# Patient Record
Sex: MALE | Race: WHITE | NOT HISPANIC OR LATINO | Employment: FULL TIME | URBAN - METROPOLITAN AREA
[De-identification: names, ages, dates, MRNs, and addresses within clinical notes are randomized per-mention and may not be internally consistent; named-entity substitution may affect disease eponyms.]

---

## 2017-04-07 ENCOUNTER — TRANSCRIBE ORDERS (OUTPATIENT)
Dept: ADMINISTRATIVE | Facility: HOSPITAL | Age: 57
End: 2017-04-07

## 2017-04-07 DIAGNOSIS — K21.00 GERD WITH ESOPHAGITIS: ICD-10-CM

## 2017-04-07 DIAGNOSIS — K45.8 RECURRENT ABDOMINAL HERNIA WITHOUT OBSTRUCTION OR GANGRENE, UNSPECIFIED HERNIA TYPE: Primary | ICD-10-CM

## 2017-04-18 ENCOUNTER — HOSPITAL ENCOUNTER (OUTPATIENT)
Facility: HOSPITAL | Age: 57
Setting detail: HOSPITAL OUTPATIENT SURGERY
Discharge: HOME | End: 2017-04-18
Attending: SURGERY | Admitting: SURGERY

## 2017-04-18 ENCOUNTER — HOSPITAL ENCOUNTER (OUTPATIENT)
Dept: CT IMAGING | Facility: HOSPITAL | Age: 57
Discharge: HOME | End: 2017-04-18
Attending: SURGERY

## 2017-04-18 DIAGNOSIS — K45.8 RECURRENT ABDOMINAL HERNIA WITHOUT OBSTRUCTION OR GANGRENE, UNSPECIFIED HERNIA TYPE: ICD-10-CM

## 2017-04-18 PROCEDURE — 74176 CT ABD & PELVIS W/O CONTRAST: CPT

## 2017-04-18 PROCEDURE — 91010 ESOPHAGUS MOTILITY STUDY: CPT | Performed by: SURGERY

## 2017-04-18 NOTE — PERIOPERATIVE NURSING NOTE
0955  Attempted to pass manometry catheter into stomach numerous times.  Passed easily through nose but repeatedly curled in throat and mouth causing patient to wretch and gag tube back up into mouth. Patient repeatedly helped us try to swallow tube into stomach without success. Passed easily to 32 cm and unable to pass more than this.

## 2017-04-28 ENCOUNTER — LAB REQUISITION (OUTPATIENT)
Dept: LAB | Facility: HOSPITAL | Age: 57
End: 2017-04-28

## 2017-04-28 DIAGNOSIS — K29.70 GASTRITIS WITHOUT BLEEDING: ICD-10-CM

## 2017-04-28 PROCEDURE — 88305 TISSUE EXAM BY PATHOLOGIST: CPT | Performed by: SURGERY

## 2017-05-01 ENCOUNTER — HOSPITAL ENCOUNTER (OUTPATIENT)
Dept: GENERAL RADIOLOGY | Facility: HOSPITAL | Age: 57
Discharge: HOME | End: 2017-05-01
Attending: SURGERY | Admitting: SURGERY

## 2017-05-01 ENCOUNTER — HOSPITAL ENCOUNTER (OUTPATIENT)
Dept: NUCLEAR MEDICINE | Facility: HOSPITAL | Age: 57
Discharge: HOME | End: 2017-05-01
Attending: SURGERY

## 2017-05-01 DIAGNOSIS — K21.00 GERD WITH ESOPHAGITIS: ICD-10-CM

## 2017-05-01 LAB
CYTO UR: NORMAL
LAB AP CASE REPORT: NORMAL
LAB AP CLINICAL INFORMATION: NORMAL
Lab: NORMAL
PATH REPORT.FINAL DX SPEC: NORMAL
PATH REPORT.GROSS SPEC: NORMAL

## 2017-05-01 PROCEDURE — 74220 X-RAY XM ESOPHAGUS 1CNTRST: CPT

## 2017-05-12 ENCOUNTER — HOSPITAL ENCOUNTER (OUTPATIENT)
Dept: NUCLEAR MEDICINE | Facility: HOSPITAL | Age: 57
Discharge: HOME | End: 2017-05-12
Attending: SURGERY

## 2017-05-12 PROCEDURE — 78264 GASTRIC EMPTYING IMG STUDY: CPT

## 2017-05-12 PROCEDURE — 0 TECHNETIUM SULFUR COLLOID: Performed by: SURGERY

## 2017-05-12 PROCEDURE — A9541 TC99M SULFUR COLLOID: HCPCS | Performed by: SURGERY

## 2017-05-12 RX ADMIN — Medication 1 DOSE: at 09:23

## 2017-06-05 ENCOUNTER — HOSPITAL ENCOUNTER (OUTPATIENT)
Dept: GENERAL RADIOLOGY | Facility: HOSPITAL | Age: 57
Discharge: HOME | End: 2017-06-05
Admitting: SURGERY

## 2017-06-05 ENCOUNTER — APPOINTMENT (OUTPATIENT)
Dept: PREADMISSION TESTING | Facility: HOSPITAL | Age: 57
End: 2017-06-05

## 2017-06-05 VITALS — BODY MASS INDEX: 32.58 KG/M2 | HEIGHT: 69 IN | WEIGHT: 220 LBS

## 2017-06-05 LAB
ALBUMIN SERPL-MCNC: 4.3 G/DL (ref 3.2–4.8)
ALBUMIN/GLOB SERPL: 1.4 G/DL (ref 1.5–2.5)
ALP SERPL-CCNC: 122 U/L (ref 25–100)
ALT SERPL W P-5'-P-CCNC: 30 U/L (ref 7–40)
ANION GAP SERPL CALCULATED.3IONS-SCNC: 5 MMOL/L (ref 3–11)
APTT PPP: <24 SECONDS (ref 24–31)
AST SERPL-CCNC: 28 U/L (ref 0–33)
BILIRUB SERPL-MCNC: 0.3 MG/DL (ref 0.3–1.2)
BUN BLD-MCNC: 15 MG/DL (ref 9–23)
BUN/CREAT SERPL: 16.7 (ref 7–25)
CALCIUM SPEC-SCNC: 9.3 MG/DL (ref 8.7–10.4)
CHLORIDE SERPL-SCNC: 109 MMOL/L (ref 99–109)
CO2 SERPL-SCNC: 25 MMOL/L (ref 20–31)
CREAT BLD-MCNC: 0.9 MG/DL (ref 0.6–1.3)
DEPRECATED RDW RBC AUTO: 47.7 FL (ref 37–54)
ERYTHROCYTE [DISTWIDTH] IN BLOOD BY AUTOMATED COUNT: 18.9 % (ref 11.3–14.5)
GFR SERPL CREATININE-BSD FRML MDRD: 88 ML/MIN/1.73
GLOBULIN UR ELPH-MCNC: 3 GM/DL
GLUCOSE BLD-MCNC: 91 MG/DL (ref 70–100)
HCT VFR BLD AUTO: 32.5 % (ref 38.9–50.9)
HGB BLD-MCNC: 8.9 G/DL (ref 13.1–17.5)
INR PPP: 0.96
MCH RBC QN AUTO: 19.1 PG (ref 27–31)
MCHC RBC AUTO-ENTMCNC: 27.4 G/DL (ref 32–36)
MCV RBC AUTO: 69.6 FL (ref 80–99)
PLATELET # BLD AUTO: 269 10*3/MM3 (ref 150–450)
PMV BLD AUTO: 8.5 FL (ref 6–12)
POTASSIUM BLD-SCNC: 4.4 MMOL/L (ref 3.5–5.5)
PROT SERPL-MCNC: 7.3 G/DL (ref 5.7–8.2)
PROTHROMBIN TIME: 10.5 SECONDS (ref 9.6–11.5)
RBC # BLD AUTO: 4.67 10*6/MM3 (ref 4.2–5.76)
SODIUM BLD-SCNC: 139 MMOL/L (ref 132–146)
WBC NRBC COR # BLD: 6.12 10*3/MM3 (ref 3.5–10.8)

## 2017-06-05 PROCEDURE — 93005 ELECTROCARDIOGRAM TRACING: CPT

## 2017-06-05 PROCEDURE — 80053 COMPREHEN METABOLIC PANEL: CPT | Performed by: SURGERY

## 2017-06-05 PROCEDURE — 85730 THROMBOPLASTIN TIME PARTIAL: CPT | Performed by: SURGERY

## 2017-06-05 PROCEDURE — 85027 COMPLETE CBC AUTOMATED: CPT | Performed by: SURGERY

## 2017-06-05 PROCEDURE — 93010 ELECTROCARDIOGRAM REPORT: CPT | Performed by: INTERNAL MEDICINE

## 2017-06-05 PROCEDURE — 36415 COLL VENOUS BLD VENIPUNCTURE: CPT

## 2017-06-05 PROCEDURE — 85610 PROTHROMBIN TIME: CPT | Performed by: SURGERY

## 2017-06-05 PROCEDURE — 71020 HC CHEST PA AND LATERAL: CPT

## 2017-06-05 RX ORDER — CEFAZOLIN SODIUM 2 G/100ML
2 INJECTION, SOLUTION INTRAVENOUS ONCE
Status: DISCONTINUED | OUTPATIENT
Start: 2017-06-08 | End: 2017-06-05

## 2017-06-05 RX ORDER — ASPIRIN 81 MG/1
81 TABLET, CHEWABLE ORAL DAILY
COMMUNITY

## 2017-06-05 NOTE — PAT
PT GIVEN PEG EDUCATIONAL FOLDER WITH INSTRUCTIONS RE: HOW TO CARE FOR IT, WHO TO CALL FOR QUESTIONS, MAINTENANCE, ETC.

## 2017-06-05 NOTE — PAT
Abnormal EKG called to Dr. Kapadia who came to [facility] and saw patient. OK to proceed with surgery, no further orders.

## 2017-06-08 ENCOUNTER — ANESTHESIA (OUTPATIENT)
Dept: PERIOP | Facility: HOSPITAL | Age: 57
End: 2017-06-08

## 2017-06-08 ENCOUNTER — HOSPITAL ENCOUNTER (OUTPATIENT)
Facility: HOSPITAL | Age: 57
Setting detail: OBSERVATION
Discharge: HOME | End: 2017-06-09
Attending: SURGERY | Admitting: SURGERY

## 2017-06-08 ENCOUNTER — ANESTHESIA EVENT (OUTPATIENT)
Dept: PERIOP | Facility: HOSPITAL | Age: 57
End: 2017-06-08

## 2017-06-08 DIAGNOSIS — K44.9 PARAESOPHAGEAL HERNIA: ICD-10-CM

## 2017-06-08 PROCEDURE — 25010000003 CEFAZOLIN IN DEXTROSE 2-4 GM/100ML-% SOLUTION: Performed by: SURGERY

## 2017-06-08 PROCEDURE — C1781 MESH (IMPLANTABLE): HCPCS | Performed by: SURGERY

## 2017-06-08 PROCEDURE — 25010000002 KETOROLAC TROMETHAMINE PER 15 MG: Performed by: NURSE ANESTHETIST, CERTIFIED REGISTERED

## 2017-06-08 PROCEDURE — G0378 HOSPITAL OBSERVATION PER HR: HCPCS

## 2017-06-08 PROCEDURE — 25010000002 DEXAMETHASONE PER 1 MG: Performed by: NURSE ANESTHETIST, CERTIFIED REGISTERED

## 2017-06-08 PROCEDURE — 25010000002 NEOSTIGMINE 10 MG/10ML SOLUTION: Performed by: NURSE ANESTHETIST, CERTIFIED REGISTERED

## 2017-06-08 PROCEDURE — 88302 TISSUE EXAM BY PATHOLOGIST: CPT | Performed by: SURGERY

## 2017-06-08 PROCEDURE — 25010000002 HYDROMORPHONE PER 4 MG: Performed by: NURSE ANESTHETIST, CERTIFIED REGISTERED

## 2017-06-08 PROCEDURE — 25010000002 ONDANSETRON PER 1 MG: Performed by: NURSE ANESTHETIST, CERTIFIED REGISTERED

## 2017-06-08 PROCEDURE — 25010000002 PROPOFOL 10 MG/ML EMULSION: Performed by: NURSE ANESTHETIST, CERTIFIED REGISTERED

## 2017-06-08 PROCEDURE — 25010000002 FENTANYL CITRATE (PF) 100 MCG/2ML SOLUTION: Performed by: NURSE ANESTHETIST, CERTIFIED REGISTERED

## 2017-06-08 PROCEDURE — 25010000002 HYDROMORPHONE HCL-NACL 50-0.9 MG/50ML-% SOLUTION PREFILLED SYRINGE: Performed by: SURGERY

## 2017-06-08 DEVICE — PERCUTANEOUS ENDOSCOPIC GASTROSTOMY KIT
Type: IMPLANTABLE DEVICE | Site: STOMACH | Status: FUNCTIONAL
Brand: ENDOVIVE SAFETY PEG KIT

## 2017-06-08 DEVICE — MESH TISS REINFORCEMENT BIO/A 7X10CM: Type: IMPLANTABLE DEVICE | Site: ABDOMEN | Status: FUNCTIONAL

## 2017-06-08 RX ORDER — CEFAZOLIN SODIUM 2 G/100ML
2 INJECTION, SOLUTION INTRAVENOUS ONCE
Status: COMPLETED | OUTPATIENT
Start: 2017-06-08 | End: 2017-06-08

## 2017-06-08 RX ORDER — ONDANSETRON 2 MG/ML
4 INJECTION INTRAMUSCULAR; INTRAVENOUS ONCE AS NEEDED
Status: DISCONTINUED | OUTPATIENT
Start: 2017-06-08 | End: 2017-06-08 | Stop reason: HOSPADM

## 2017-06-08 RX ORDER — FENTANYL CITRATE 50 UG/ML
INJECTION, SOLUTION INTRAMUSCULAR; INTRAVENOUS AS NEEDED
Status: DISCONTINUED | OUTPATIENT
Start: 2017-06-08 | End: 2017-06-08 | Stop reason: SURG

## 2017-06-08 RX ORDER — PROMETHAZINE HYDROCHLORIDE 25 MG/ML
12.5 INJECTION, SOLUTION INTRAMUSCULAR; INTRAVENOUS EVERY 6 HOURS PRN
Status: DISCONTINUED | OUTPATIENT
Start: 2017-06-08 | End: 2017-06-09 | Stop reason: HOSPADM

## 2017-06-08 RX ORDER — PROMETHAZINE HYDROCHLORIDE 25 MG/1
25 TABLET ORAL ONCE AS NEEDED
Status: DISCONTINUED | OUTPATIENT
Start: 2017-06-08 | End: 2017-06-08 | Stop reason: HOSPADM

## 2017-06-08 RX ORDER — FAMOTIDINE 20 MG/1
20 TABLET, FILM COATED ORAL ONCE
Status: DISCONTINUED | OUTPATIENT
Start: 2017-06-08 | End: 2017-06-08

## 2017-06-08 RX ORDER — LIDOCAINE HYDROCHLORIDE 10 MG/ML
0.5 INJECTION, SOLUTION EPIDURAL; INFILTRATION; INTRACAUDAL; PERINEURAL ONCE AS NEEDED
Status: COMPLETED | OUTPATIENT
Start: 2017-06-08 | End: 2017-06-08

## 2017-06-08 RX ORDER — LEVOTHYROXINE SODIUM 0.15 MG/1
150 TABLET ORAL DAILY
Status: DISCONTINUED | OUTPATIENT
Start: 2017-06-08 | End: 2017-06-09 | Stop reason: HOSPADM

## 2017-06-08 RX ORDER — ROCURONIUM BROMIDE 10 MG/ML
INJECTION, SOLUTION INTRAVENOUS AS NEEDED
Status: DISCONTINUED | OUTPATIENT
Start: 2017-06-08 | End: 2017-06-08 | Stop reason: SURG

## 2017-06-08 RX ORDER — NEOSTIGMINE METHYLSULFATE 1 MG/ML
INJECTION, SOLUTION INTRAVENOUS AS NEEDED
Status: DISCONTINUED | OUTPATIENT
Start: 2017-06-08 | End: 2017-06-08 | Stop reason: SURG

## 2017-06-08 RX ORDER — NALOXONE HCL 0.4 MG/ML
0.1 VIAL (ML) INJECTION
Status: DISCONTINUED | OUTPATIENT
Start: 2017-06-08 | End: 2017-06-09 | Stop reason: HOSPADM

## 2017-06-08 RX ORDER — BUPIVACAINE HYDROCHLORIDE AND EPINEPHRINE 2.5; 5 MG/ML; UG/ML
INJECTION, SOLUTION EPIDURAL; INFILTRATION; INTRACAUDAL; PERINEURAL AS NEEDED
Status: DISCONTINUED | OUTPATIENT
Start: 2017-06-08 | End: 2017-06-08 | Stop reason: HOSPADM

## 2017-06-08 RX ORDER — MAGNESIUM HYDROXIDE 1200 MG/15ML
LIQUID ORAL AS NEEDED
Status: DISCONTINUED | OUTPATIENT
Start: 2017-06-08 | End: 2017-06-08 | Stop reason: HOSPADM

## 2017-06-08 RX ORDER — PROPOFOL 10 MG/ML
VIAL (ML) INTRAVENOUS AS NEEDED
Status: DISCONTINUED | OUTPATIENT
Start: 2017-06-08 | End: 2017-06-08 | Stop reason: SURG

## 2017-06-08 RX ORDER — HYDROMORPHONE HCL IN 0.9% NACL 50 MG/50ML
PATIENT CONTROLLED ANALGESIA SYRINGE INTRAVENOUS CONTINUOUS
Status: DISCONTINUED | OUTPATIENT
Start: 2017-06-08 | End: 2017-06-09

## 2017-06-08 RX ORDER — ATRACURIUM BESYLATE 10 MG/ML
INJECTION, SOLUTION INTRAVENOUS AS NEEDED
Status: DISCONTINUED | OUTPATIENT
Start: 2017-06-08 | End: 2017-06-08 | Stop reason: SURG

## 2017-06-08 RX ORDER — DEXAMETHASONE SODIUM PHOSPHATE 4 MG/ML
INJECTION, SOLUTION INTRA-ARTICULAR; INTRALESIONAL; INTRAMUSCULAR; INTRAVENOUS; SOFT TISSUE AS NEEDED
Status: DISCONTINUED | OUTPATIENT
Start: 2017-06-08 | End: 2017-06-08 | Stop reason: SURG

## 2017-06-08 RX ORDER — ONDANSETRON 2 MG/ML
4 INJECTION INTRAMUSCULAR; INTRAVENOUS EVERY 6 HOURS PRN
Status: DISCONTINUED | OUTPATIENT
Start: 2017-06-08 | End: 2017-06-09 | Stop reason: HOSPADM

## 2017-06-08 RX ORDER — HYDROMORPHONE HYDROCHLORIDE 1 MG/ML
0.5 INJECTION, SOLUTION INTRAMUSCULAR; INTRAVENOUS; SUBCUTANEOUS
Status: DISCONTINUED | OUTPATIENT
Start: 2017-06-08 | End: 2017-06-08 | Stop reason: HOSPADM

## 2017-06-08 RX ORDER — FENTANYL CITRATE 50 UG/ML
50 INJECTION, SOLUTION INTRAMUSCULAR; INTRAVENOUS
Status: DISCONTINUED | OUTPATIENT
Start: 2017-06-08 | End: 2017-06-08 | Stop reason: HOSPADM

## 2017-06-08 RX ORDER — KETOROLAC TROMETHAMINE 30 MG/ML
INJECTION, SOLUTION INTRAMUSCULAR; INTRAVENOUS AS NEEDED
Status: DISCONTINUED | OUTPATIENT
Start: 2017-06-08 | End: 2017-06-08 | Stop reason: SURG

## 2017-06-08 RX ORDER — PROMETHAZINE HYDROCHLORIDE 25 MG/ML
12.5 INJECTION, SOLUTION INTRAMUSCULAR; INTRAVENOUS EVERY 4 HOURS PRN
Status: DISCONTINUED | OUTPATIENT
Start: 2017-06-08 | End: 2017-06-09 | Stop reason: HOSPADM

## 2017-06-08 RX ORDER — PROMETHAZINE HYDROCHLORIDE 25 MG/ML
6.25 INJECTION, SOLUTION INTRAMUSCULAR; INTRAVENOUS ONCE AS NEEDED
Status: DISCONTINUED | OUTPATIENT
Start: 2017-06-08 | End: 2017-06-08 | Stop reason: HOSPADM

## 2017-06-08 RX ORDER — DOCUSATE SODIUM 100 MG/1
100 CAPSULE, LIQUID FILLED ORAL 2 TIMES DAILY
Status: DISCONTINUED | OUTPATIENT
Start: 2017-06-08 | End: 2017-06-09 | Stop reason: HOSPADM

## 2017-06-08 RX ORDER — GLYCOPYRROLATE 0.2 MG/ML
INJECTION INTRAMUSCULAR; INTRAVENOUS AS NEEDED
Status: DISCONTINUED | OUTPATIENT
Start: 2017-06-08 | End: 2017-06-08 | Stop reason: SURG

## 2017-06-08 RX ORDER — PROMETHAZINE HYDROCHLORIDE 25 MG/1
25 SUPPOSITORY RECTAL ONCE AS NEEDED
Status: DISCONTINUED | OUTPATIENT
Start: 2017-06-08 | End: 2017-06-08 | Stop reason: HOSPADM

## 2017-06-08 RX ORDER — FAMOTIDINE 10 MG/ML
20 INJECTION, SOLUTION INTRAVENOUS ONCE
Status: COMPLETED | OUTPATIENT
Start: 2017-06-08 | End: 2017-06-08

## 2017-06-08 RX ORDER — LIDOCAINE HYDROCHLORIDE 10 MG/ML
INJECTION, SOLUTION EPIDURAL; INFILTRATION; INTRACAUDAL; PERINEURAL AS NEEDED
Status: DISCONTINUED | OUTPATIENT
Start: 2017-06-08 | End: 2017-06-08 | Stop reason: SURG

## 2017-06-08 RX ORDER — ONDANSETRON 4 MG/1
4 TABLET, FILM COATED ORAL EVERY 6 HOURS PRN
Status: DISCONTINUED | OUTPATIENT
Start: 2017-06-08 | End: 2017-06-09 | Stop reason: HOSPADM

## 2017-06-08 RX ORDER — ONDANSETRON 2 MG/ML
INJECTION INTRAMUSCULAR; INTRAVENOUS AS NEEDED
Status: DISCONTINUED | OUTPATIENT
Start: 2017-06-08 | End: 2017-06-08 | Stop reason: SURG

## 2017-06-08 RX ORDER — SODIUM CHLORIDE, SODIUM LACTATE, POTASSIUM CHLORIDE, CALCIUM CHLORIDE 600; 310; 30; 20 MG/100ML; MG/100ML; MG/100ML; MG/100ML
9 INJECTION, SOLUTION INTRAVENOUS CONTINUOUS
Status: DISCONTINUED | OUTPATIENT
Start: 2017-06-08 | End: 2017-06-09

## 2017-06-08 RX ORDER — ASPIRIN 81 MG/1
81 TABLET, CHEWABLE ORAL DAILY
Status: DISCONTINUED | OUTPATIENT
Start: 2017-06-08 | End: 2017-06-09 | Stop reason: HOSPADM

## 2017-06-08 RX ORDER — ROSUVASTATIN CALCIUM 20 MG/1
20 TABLET, COATED ORAL DAILY
Status: DISCONTINUED | OUTPATIENT
Start: 2017-06-08 | End: 2017-06-09 | Stop reason: HOSPADM

## 2017-06-08 RX ORDER — SODIUM CHLORIDE, SODIUM LACTATE, POTASSIUM CHLORIDE, CALCIUM CHLORIDE 600; 310; 30; 20 MG/100ML; MG/100ML; MG/100ML; MG/100ML
50 INJECTION, SOLUTION INTRAVENOUS CONTINUOUS
Status: DISCONTINUED | OUTPATIENT
Start: 2017-06-08 | End: 2017-06-09 | Stop reason: HOSPADM

## 2017-06-08 RX ORDER — SODIUM CHLORIDE 0.9 % (FLUSH) 0.9 %
1-10 SYRINGE (ML) INJECTION AS NEEDED
Status: DISCONTINUED | OUTPATIENT
Start: 2017-06-08 | End: 2017-06-08 | Stop reason: HOSPADM

## 2017-06-08 RX ORDER — DIPHENHYDRAMINE HYDROCHLORIDE 50 MG/ML
25 INJECTION INTRAMUSCULAR; INTRAVENOUS EVERY 6 HOURS PRN
Status: DISCONTINUED | OUTPATIENT
Start: 2017-06-08 | End: 2017-06-09 | Stop reason: HOSPADM

## 2017-06-08 RX ORDER — PROMETHAZINE HYDROCHLORIDE 6.25 MG/5ML
12.5 SYRUP ORAL EVERY 6 HOURS PRN
Status: DISCONTINUED | OUTPATIENT
Start: 2017-06-08 | End: 2017-06-09 | Stop reason: HOSPADM

## 2017-06-08 RX ORDER — HEPARIN SODIUM 5000 [USP'U]/ML
5000 INJECTION, SOLUTION INTRAVENOUS; SUBCUTANEOUS EVERY 8 HOURS SCHEDULED
Status: DISCONTINUED | OUTPATIENT
Start: 2017-06-09 | End: 2017-06-09 | Stop reason: HOSPADM

## 2017-06-08 RX ADMIN — HYDROMORPHONE HYDROCHLORIDE 0.5 MG: 1 INJECTION, SOLUTION INTRAMUSCULAR; INTRAVENOUS; SUBCUTANEOUS at 11:10

## 2017-06-08 RX ADMIN — DOCUSATE SODIUM 100 MG: 100 CAPSULE, LIQUID FILLED ORAL at 18:01

## 2017-06-08 RX ADMIN — KETOROLAC TROMETHAMINE 30 MG: 30 INJECTION, SOLUTION INTRAMUSCULAR at 10:09

## 2017-06-08 RX ADMIN — LIDOCAINE HYDROCHLORIDE 0.2 ML: 10 INJECTION, SOLUTION EPIDURAL; INFILTRATION; INTRACAUDAL; PERINEURAL at 07:04

## 2017-06-08 RX ADMIN — ATRACURIUM BESYLATE 10 MG: 10 INJECTION, SOLUTION INTRAVENOUS at 09:07

## 2017-06-08 RX ADMIN — NEOSTIGMINE METHYLSULFATE 2.5 MG: 1 INJECTION, SOLUTION INTRAVENOUS at 10:09

## 2017-06-08 RX ADMIN — ONDANSETRON 4 MG: 2 INJECTION INTRAMUSCULAR; INTRAVENOUS at 10:09

## 2017-06-08 RX ADMIN — LIDOCAINE HYDROCHLORIDE 50 MG: 10 INJECTION, SOLUTION EPIDURAL; INFILTRATION; INTRACAUDAL; PERINEURAL at 07:56

## 2017-06-08 RX ADMIN — ROBINUL 0.4 MG: 0.2 INJECTION INTRAMUSCULAR; INTRAVENOUS at 10:09

## 2017-06-08 RX ADMIN — FAMOTIDINE 20 MG: 10 INJECTION, SOLUTION INTRAVENOUS at 07:04

## 2017-06-08 RX ADMIN — HYDROMORPHONE HYDROCHLORIDE 0.5 MG: 1 INJECTION, SOLUTION INTRAMUSCULAR; INTRAVENOUS; SUBCUTANEOUS at 10:55

## 2017-06-08 RX ADMIN — FENTANYL CITRATE 50 MCG: 50 INJECTION, SOLUTION INTRAMUSCULAR; INTRAVENOUS at 07:56

## 2017-06-08 RX ADMIN — ROCURONIUM BROMIDE 50 MG: 10 INJECTION INTRAVENOUS at 07:56

## 2017-06-08 RX ADMIN — Medication: at 10:50

## 2017-06-08 RX ADMIN — FENTANYL CITRATE 25 MCG: 50 INJECTION, SOLUTION INTRAMUSCULAR; INTRAVENOUS at 08:05

## 2017-06-08 RX ADMIN — PROPOFOL 200 MG: 10 INJECTION, EMULSION INTRAVENOUS at 07:56

## 2017-06-08 RX ADMIN — CEFAZOLIN SODIUM 2 G: 2 INJECTION, SOLUTION INTRAVENOUS at 07:52

## 2017-06-08 RX ADMIN — DEXAMETHASONE SODIUM PHOSPHATE 8 MG: 4 INJECTION, SOLUTION INTRAMUSCULAR; INTRAVENOUS at 08:05

## 2017-06-08 RX ADMIN — FENTANYL CITRATE 25 MCG: 50 INJECTION, SOLUTION INTRAMUSCULAR; INTRAVENOUS at 08:30

## 2017-06-08 RX ADMIN — SODIUM CHLORIDE, POTASSIUM CHLORIDE, SODIUM LACTATE AND CALCIUM CHLORIDE 9 ML/HR: 600; 310; 30; 20 INJECTION, SOLUTION INTRAVENOUS at 07:04

## 2017-06-08 NOTE — OP NOTE
DATE OF PROCEDURE:  06/08/2017     PREOPERATIVE DIAGNOSIS: Paraesophageal hernia.     POSTOPERATIVE DIAGNOSIS: Paraesophageal hernia.     PROCEDURES PERFORMED:  1. Laparoscopic paraesophageal hernia repair with mesh, Toupet fundoplication.   2. Esophagogastroduodenoscopy with percutaneous endoscopic gastrostomy placement.   3. Extensive lysis of adhesions.     SURGEON: Harshal Vasquez MD     ASSISTANT: LOCO Hugo MD, whose responsibilities were to assist in retraction and visualization of structures in performing the EGD and PEG.    SPECIMEN: Hernia sac.     ANESTHESIA: General.     FINDINGS:  1. Adhesions from the patient's previous laparoscopic incisional hernia repair taken down using blunt and ultrasonic dissection without injury to bowel, but increased the complexity of the case by at least 50% and adding an additional 30 minutes to the procedure.  2. Posterior hiatal cruroplasty performed using 0 silk pledgeted sutures reinforced with a piece of [place] Bio-A mesh cut in a U-shaped fashion and placed posteriorly.   3. A 300° Toupet fundoplication performed around a 38-Hebrew bougie without difficulty.   4. A 20-Hebrew percutaneous endoscopy gastrostomy tube placed through the skin at the 4 cm level.     INDICATIONS: The patient is a 56-year-old gentleman with a history of a paraesophageal hernia. The risks and benefits of open repair were discussed at length with the patient and his family after complete preoperative work-up and he agreed to proceed.     DESCRIPTION OF PROCEDURE: After obtaining informed consent, the patient was taken to the operating room and placed in the supine position. After appropriate DVT and antibiotic prophylaxis, general anesthesia was induced. The abdomen was prepped and draped in a standard sterile fashion. After infiltrating the skin with local anesthetic, a transverse 12 mm skin incision was made approximately 10 cm from the xiphoid process along the left costal margin. An  optically-guided trocar was advanced without difficulty in the abdominal cavity. The abdomen was insufflated with carbon dioxide gas to a pressure of 15 mmHg. At this point, the laparoscope was advanced through the trocar and the abdominal contents inspected. There is no evidence of bowel, bladder or visceral injury with entrance of the trocar. Additional 5 mm port was placed 20 cm from the xiphoid process along the left costal margin under direct visualization. There were adhesions of omentum to the anterior abdominal wall in the patient's previous mesh repair that were taken down slightly, bluntly and we did ultrasonic dissection of omentum only. No bowel was encountered whatsoever and was well away from the operative field. After this lysis of adhesions took approximately 30 minutes, there was adequate room for the placement of the additional port trocars. The camera trocar did have to be placed through the mesh itself. At this point, a standard laparoscopic Nissen fundoplication port placement schema was followed. The liver retractor was used to retract the liver anteriorly. The greater curvature of the stomach was then mobilized using ultrasonic dissection up to and including the left tash. The left tash was then scored from the 6 o’clock to the 12 o’clock position and dissection of the mediastinum was carried out. The left pleural space was identified and spared.     The pars flaccida was then divided superior and inferior to the hepatic branch of the vagus nerve which was spared throughout the procedure. The anterior surface of the right tash was then scored using meticulous blunt dissection into the mediastinum and circumferential mobilization of the GE junction from the hiatus was performed using a combination of blunt electrocautery and ultrasonic dissection. The right and left pleural spaces were identified and spared. The anterior vagus was identified and spared. The posterior vagus was well displaced by  "the posterior aspect of the hernia sac and had to be taken. At this point, circumferential dissection was carried up to level of the aortic arch circumferentially. With this thus completed, the hernia sac was then resected using ultrasonic dissection and passed off as specimen.     A posterior hiatal cruroplasty was then performed using a 0 silk pledgeted suture. This produced a snug closure of the hiatus around the esophagus. This was reinforced with a piece of [place] BIO-A mesh cut in a U-shaped fashion and placed posteriorly and tacked to the diaphragm using silk sutures.     The anesthetist advanced a 38-Pakistani orogastric bougie into the mouth and esophagus and into the stomach without difficulty. Around the bougie at the level of the GE junction a 300° Toupet fundoplication performed by placing the wrap posteriorly and tacked using silk suture. At the completion of the fundoplication, the Penrose drain was removed and discarded. The bougie was then removed.     Dr. Hugo advanced the endoscope into the mouth and through the esophagus without difficulty. It was advanced to the distal esophagus where the Z line could be seen within the fundoplication. The scope was advanced into the stomach and under maximal insufflation a retroflexed view of the GE junction was obtained. This demonstrated excellent \"stack of coins\" appearance with good apposition of the GE junction to the scope. The scope was then used to help guide placement of a PEG tube. After infiltrating the skin with local anesthetic, a transverse 7 mm skin incision was made. The needle was advanced through this incision into the stomach under endoscopic and laparoscopic guidance. This was well away from the mesh. A guidewire was placed in the needle and grasped with the endoscope and brought out through the mouth. At this point, using the Ponsky pull technique, a 20-Pakistani PEG tube was brought through the abdominal wall in this location. The endoscope was " advanced back through the mouth and esophagus without difficulty. It was advanced into the stomach where the PEG bumper could be seen abutting the anterior gastric wall in a standard fashion without evidence of bleeding. The scope was then used to desufflate the stomach and removed from the patient's mouth without difficulty. The PEG tube was secured to the skin at the 4 cm level and the bumper at the 4.5 cm level, and sutured to the skin using a nylon suture.     Liver retractor was then removed. All trocars were removed under direct laparoscopic visualization after placing a 0 Vicryl suture in a figure-of-eight fashion around the camera trocar site which was meshed to close the defect.     The abdomen was then desufflated and all trocars were removed under direct laparoscopic visualization. The wound was copiously irrigated with normal saline until clear and the skin closed in each area using running subcuticular suture. The incisions were dressed in a standard sterile fashion and covered with dry sterile dressing. The PEG tube was secured and dressed in a standard sterile fashion and placed to gravity drainage. The patient recovered from anesthesia well, was extubated in the operating room and transported to the PACU in stable condition. All sponge and needle counts were correct x2 at the completion of the case.      Harshal Vasquez MD*  CLAUDIA/manisha  DD: 06/08/2017 10:30:38  DT: 06/08/2017 14:48:40  Voice Rec. ID #[id number]  Voice Original ID #[id number]  Doc ID #[id number]  Rev. #0  cc:

## 2017-06-08 NOTE — PROGRESS NOTES
"Rusty Swanson  1960  [id number]    Surgery Post - Operative Note    Date of visit: 6/8/2017    Subjective   Subjective: Feels OK, pain controlled.       Objective     Objective:    /63 (BP Location: Right arm, Patient Position: Lying)  Pulse 71  Temp 98.8 °F (37.1 °C) (Oral)   Resp 18  Ht 69\" (175.3 cm)  Wt 220 lb (99.8 kg)  SpO2 94%  BMI 32.49 kg/m2    CV:  Rate  regular and rhythm  regular  L:  Clear  to auscultation bilaterally   ABD:  Soft, appropriately tender. Dressings  clean, dry and intact , PEG c/d/i  EXT:  No cyanosis, clubbing or edema         Assessment/Plan     Assessment/ Plan: Doing well after Lap PEH, PEG. Continue Pulmonary toilet    Hospital Problem List     Paraesophageal hernia              Harshal Vasquez MD  6/8/2017  5:41 PM    "

## 2017-06-08 NOTE — ANESTHESIA POSTPROCEDURE EVALUATION
Patient: Rusty Swanson    Procedure Summary     Date Anesthesia Start Anesthesia Stop Room / Location    06/08/17 0752   [facility] / [facility]       Procedure Diagnosis Surgeon Provider    PARAESOPHAGEAL HERNIA REPAIR LAPAROSCOPIC WITH MESH, TOUPET, EGD, PEG, LYSIS OF ADHESIONS, FUNDOPLICATION  (N/A Abdomen) No diagnosis on file. MD Jem Estevez MD          Anesthesia Type: general  Last vitals  BP   132/90   Temp   97.4   Pulse  101   Resp   16   SpO2   97     Post Anesthesia Care and Evaluation    Patient location during evaluation: PACU  Patient participation: complete - patient participated  Level of consciousness: awake and alert  Pain score: 0  Pain management: adequate  Airway patency: patent  Anesthetic complications: No anesthetic complications  PONV Status: none  Cardiovascular status: hemodynamically stable and acceptable  Respiratory status: nonlabored ventilation, acceptable and nasal cannula  Hydration status: acceptable

## 2017-06-08 NOTE — ANESTHESIA PREPROCEDURE EVALUATION
Anesthesia Evaluation     Patient summary reviewed and Nursing notes reviewed          Airway   Mallampati: I  TM distance: >3 FB  Neck ROM: full  no difficulty expected  Dental      Pulmonary - negative pulmonary ROS   Cardiovascular - negative cardio ROS        Neuro/Psych- negative ROS  GI/Hepatic/Renal/Endo    (+)  GERD, hypothyroidism,     Musculoskeletal (-) negative ROS    Abdominal    Substance History - negative use     OB/GYN negative ob/gyn ROS         Other                                        Anesthesia Plan    ASA 2     general   (POSSIBLE TAP)  intravenous induction   Anesthetic plan and risks discussed with patient.    Plan discussed with CRNA.

## 2017-06-08 NOTE — H&P
Patient Care Team:      Chief complaint: GERD    Subjective:    Patient is a 56 y.o.male presents with GERD   Review of Systems:  General ROS: Hypothyroid  Cardiovascular ROS: no chest pain or dyspnea on exertion  Respiratory ROS: no cough, shortness of breath, or wheezing      Allergies: No Known Allergies       Latex: no  Contrast Dye: no    Home Meds    Prescriptions Prior to Admission   Medication Sig Dispense Refill Last Dose   • aspirin 81 MG chewable tablet Chew 81 mg Daily. STOPPED 6/5/17 FOR UPCOMING SURGERY      • Levothyroxine Sodium (SYNTHROID PO) Take 1 tablet/day by mouth Daily.      • PANTOPRAZOLE SODIUM PO Take 1 tablet/day by mouth Daily.      • Rosuvastatin Calcium (CRESTOR PO) Take 1 tablet by mouth Daily.        PMH:   Past Medical History:   Diagnosis Date   • Anemia    • Disease of thyroid gland    • GERD (gastroesophageal reflux disease)    • Smoker    • Wears glasses      PSH:    Past Surgical History:   Procedure Laterality Date   • BACK SURGERY     • ESOPHAGEAL MOTILITY STUDY N/A 4/18/2017    Procedure: ESOPHAGEAL MOTILITY STUDY;  Surgeon: Harshal Vasquez MD;  [facility];  Service:    • HAND SURGERY     • HERNIA REPAIR       Immunization History: pneumo: no   Flu: no  Tetanus: 2 yrs  Social History:   Tobacco: 1 ppd   Alcohol: no      Physical Exam: Temp:  [97.1 °F (36.2 °C)] 97.1 °F (36.2 °C)  Heart Rate:  [70] 70  Resp:  [16] 16  BP: (123)/(93) 123/93    General Appearance:    Alert, cooperative, no distress, appears stated age   Head:    Normocephalic, without obvious abnormality, atraumatic   Lungs:     Clear to auscultation bilaterally, respirations unlabored    Heart:  Regular rate and rhythm, S1 and S2 normal, no murmur, rub    or gallop    Abdomen:    Soft without tenderness   Breast Exam:    deferred   Genitalia:    deferred   Extremities:   Extremities normal, atraumatic, no cyanosis or edema   Skin:   Skin color, texture, turgor normal, no rashes or lesions    Neurologic:   Grossly intact       Cancer Patient: __ yes __no __unknown; If yes, clinical stage T:__ N:__M:__, stage group    Impression: GERD    Plan:Laparoscopic Paraesophageal Hernia repair with mesh, ENRICO reagan, PEG  Charles Gordon PA-C 6/8/2017 6:56 AM

## 2017-06-08 NOTE — ANESTHESIA PROCEDURE NOTES
Airway  Urgency: elective    Airway not difficult    General Information and Staff    Patient location during procedure: OR    Indications and Patient Condition  Indications for airway management: airway protection    Preoxygenated: yes  MILS not maintained throughout  Mask difficulty assessment: 1 - vent by mask    Final Airway Details  Final airway type: endotracheal airway      Successful airway: ETT  Cuffed: yes   Successful intubation technique: direct laryngoscopy  Facilitating devices/methods: cricoid pressure  Endotracheal tube insertion site: oral  Blade: Nick  Blade size: #3  ETT size: 7.5 mm  Cormack-Lehane Classification: grade I - full view of glottis  Placement verified by: chest auscultation and capnometry   Measured from: lips  ETT to lips (cm): 22  Number of attempts at approach: 1    Additional Comments  Negative epigastric sounds, Breath sound equal bilaterally with symmetric chest rise and fall

## 2017-06-08 NOTE — OP NOTE
DATE OF PROCEDURE:  06/08/2017    PREOPERATIVE DIAGNOSIS:  Paraesophageal hernia.     POSTOPERATIVE DIAGNOSIS: Paraesophageal hernia.     PROCEDURE PERFORMED: Upper endoscopy with percutaneous endoscopic gastrostomy tube placement.     SURGEON: LOCO Hugo MD     ASSISTANT:  Harshal Vasquez MD; Dr. Vasquez performed a laparoscopic Toupet fundoplication with crural repair and mesh placement.     FINDINGS: No gross evidence of esophagitis. The GE junction appears to be in the Toupet fundoplication.  Intragastric appearance to the Toupet fundoplication things looked to be in good order, no obvious evidence of gastritis.     DESCRIPTION OF PROCEDURE: The patient was asleep in the supine position. I advanced the Olympus endoscope through the patient's oropharynx under direct visualization into the esophagus. I traversed the esophagus down to the GE junction, which appeared to be in the Toupet fundoplication. I advanced the scope passed through into the gastric lumen and maximally insufflated the stomach. I retroflexed the scope and looked at our Toupet fundoplication, which appeared to be in good order. There was no significant evidence of gastritis.  At this point, an introducer needle was passed through the anterior abdominal wall into the gastric lumen. The sheath ensnared.  The wire was placed through our introducer sheath and then ensnared and brought out the patient's mouth. In a pull fashion, the 20-Slovak PEG was attached to the wire and pulled through into the gastric lumen.  I then reintroduced the Olympus endoscope through the patient's oropharynx, down into the esophagus, and into the gastric lumen. The PEG abutted the gastric mucosa well. There was no evidence of bleeding.  The stomach was desufflated and the scope withdrawn. The patient tolerated this portion of the procedure well.       LOCO Hugo MD*  SABAS/tf  DD: 06/08/2017 10:16:53  DT: 06/08/2017 14:06:30  Voice Rec. ID #[id number]  Voice  Original ID #[id number]  Doc ID #[id number]  Rev. #0  cc:

## 2017-06-08 NOTE — PLAN OF CARE
Problem: Patient Care Overview (Adult)  Goal: Plan of Care Review  Outcome: Ongoing (interventions implemented as appropriate)    06/08/17 1323   Coping/Psychosocial Response Interventions   Plan Of Care Reviewed With patient;spouse   Patient Care Overview   Progress progress toward functional goals as expected         Problem: Perioperative Period (Adult)  Goal: Signs and Symptoms of Listed Potential Problems Will be Absent or Manageable (Perioperative Period)  Outcome: Ongoing (interventions implemented as appropriate)    06/08/17 1323   Perioperative Period   Problems Assessed (Perioperative Period) all   Problems Present (Perioperative Period) pain

## 2017-06-08 NOTE — BRIEF OP NOTE
PARAESOPHAGEAL HERNIA REPAIR LAPAROSCOPIC  Procedure Note    Rusty Swanson  6/8/2017    Pre-op Diagnosis:   Paraesophageal hernia    Post-op Diagnosis:     Same    Procedure/CPT® Codes:      Procedure(s):  PARAESOPHAGEAL HERNIA REPAIR LAPAROSCOPIC WITH MESH, TOUPET, EGD, PEG, LYSIS OF ADHESIONS, FUNDOPLICATION     Surgeon(s):  MD Ja Estevez MD    Anesthesia: General    Staff:   Circulator: Rosemary Emery RN; Juana Jacob RN  Scrub Person: Maria Teresa Huston  Nursing Assistant: She Byrnes; Sue Lyle    Estimated Blood Loss: *No blood loss documented*  Urine Voided: * No values recorded between 6/8/2017  7:52 AM and 6/8/2017 10:14 AM *    Specimens:                  ID Type Source Tests Collected by Time Destination   A : PARAESOPHAGEAL HERNIA SAC Tissue Hernia, Sac TISSUE EXAM Harshal Vasquez MD 6/8/2017 0910          Drains:   Drain/Device Site 06/08/17 other (see comments) PEG TUBE  (Active)           Findings:   1) Lysis of adhesions adding 30 minutes to procedure  2) Type III PEH  3) 20 Fr PEG at 4 cm    Complications: None      Harshal Vasquez MD     Date: 6/8/2017  Time: 10:19 AM

## 2017-06-08 NOTE — PLAN OF CARE
Problem: Perioperative Period (Adult)  Goal: Signs and Symptoms of Listed Potential Problems Will be Absent or Manageable (Perioperative Period)  Outcome: Ongoing (interventions implemented as appropriate)    06/08/17 0623   Perioperative Period   Problems Assessed (Perioperative Period) all   Problems Present (Perioperative Period) none

## 2017-06-09 ENCOUNTER — APPOINTMENT (OUTPATIENT)
Dept: GENERAL RADIOLOGY | Facility: HOSPITAL | Age: 57
End: 2017-06-09
Attending: SURGERY

## 2017-06-09 VITALS
HEIGHT: 69 IN | OXYGEN SATURATION: 98 % | TEMPERATURE: 98.2 F | HEART RATE: 75 BPM | BODY MASS INDEX: 32.58 KG/M2 | RESPIRATION RATE: 18 BRPM | WEIGHT: 220 LBS | SYSTOLIC BLOOD PRESSURE: 111 MMHG | DIASTOLIC BLOOD PRESSURE: 72 MMHG

## 2017-06-09 LAB
ANION GAP SERPL CALCULATED.3IONS-SCNC: 6 MMOL/L (ref 3–11)
BASOPHILS # BLD AUTO: 0.01 10*3/MM3 (ref 0–0.2)
BASOPHILS NFR BLD AUTO: 0.1 % (ref 0–1)
BUN BLD-MCNC: 12 MG/DL (ref 9–23)
BUN/CREAT SERPL: 15 (ref 7–25)
CALCIUM SPEC-SCNC: 9.1 MG/DL (ref 8.7–10.4)
CHLORIDE SERPL-SCNC: 105 MMOL/L (ref 99–109)
CO2 SERPL-SCNC: 27 MMOL/L (ref 20–31)
CREAT BLD-MCNC: 0.8 MG/DL (ref 0.6–1.3)
CYTO UR: NORMAL
DEPRECATED RDW RBC AUTO: 48.2 FL (ref 37–54)
EOSINOPHIL # BLD AUTO: 0 10*3/MM3 (ref 0.1–0.3)
EOSINOPHIL NFR BLD AUTO: 0 % (ref 0–3)
ERYTHROCYTE [DISTWIDTH] IN BLOOD BY AUTOMATED COUNT: 18.9 % (ref 11.3–14.5)
GFR SERPL CREATININE-BSD FRML MDRD: 100 ML/MIN/1.73
GLUCOSE BLD-MCNC: 119 MG/DL (ref 70–100)
HCT VFR BLD AUTO: 27.5 % (ref 38.9–50.9)
HGB BLD-MCNC: 7.6 G/DL (ref 13.1–17.5)
IMM GRANULOCYTES # BLD: 0.03 10*3/MM3 (ref 0–0.03)
IMM GRANULOCYTES NFR BLD: 0.3 % (ref 0–0.6)
LAB AP CASE REPORT: NORMAL
LAB AP CLINICAL INFORMATION: NORMAL
LYMPHOCYTES # BLD AUTO: 1.13 10*3/MM3 (ref 0.6–4.8)
LYMPHOCYTES NFR BLD AUTO: 11.6 % (ref 24–44)
Lab: NORMAL
MCH RBC QN AUTO: 19.3 PG (ref 27–31)
MCHC RBC AUTO-ENTMCNC: 27.6 G/DL (ref 32–36)
MCV RBC AUTO: 69.8 FL (ref 80–99)
MONOCYTES # BLD AUTO: 0.62 10*3/MM3 (ref 0–1)
MONOCYTES NFR BLD AUTO: 6.3 % (ref 0–12)
NEUTROPHILS # BLD AUTO: 7.98 10*3/MM3 (ref 1.5–8.3)
NEUTROPHILS NFR BLD AUTO: 81.7 % (ref 41–71)
PATH REPORT.FINAL DX SPEC: NORMAL
PATH REPORT.GROSS SPEC: NORMAL
PLATELET # BLD AUTO: 233 10*3/MM3 (ref 150–450)
PMV BLD AUTO: 8.7 FL (ref 6–12)
POTASSIUM BLD-SCNC: 4 MMOL/L (ref 3.5–5.5)
RBC # BLD AUTO: 3.94 10*6/MM3 (ref 4.2–5.76)
SODIUM BLD-SCNC: 138 MMOL/L (ref 132–146)
WBC NRBC COR # BLD: 9.77 10*3/MM3 (ref 3.5–10.8)

## 2017-06-09 PROCEDURE — 25010000002 HEPARIN (PORCINE) PER 1000 UNITS: Performed by: SURGERY

## 2017-06-09 PROCEDURE — G0378 HOSPITAL OBSERVATION PER HR: HCPCS

## 2017-06-09 PROCEDURE — 74241: CPT

## 2017-06-09 PROCEDURE — 80048 BASIC METABOLIC PNL TOTAL CA: CPT | Performed by: SURGERY

## 2017-06-09 PROCEDURE — 0 DIATRIZOATE MEGLUMINE & SODIUM PER 1 ML: Performed by: SURGERY

## 2017-06-09 PROCEDURE — 85025 COMPLETE CBC W/AUTO DIFF WBC: CPT | Performed by: SURGERY

## 2017-06-09 RX ORDER — PROMETHAZINE HYDROCHLORIDE 6.25 MG/5ML
12.5 SYRUP ORAL EVERY 6 HOURS PRN
Qty: 300 ML | Refills: 0 | Status: SHIPPED | OUTPATIENT
Start: 2017-06-09 | End: 2020-10-18

## 2017-06-09 RX ADMIN — DOCUSATE SODIUM 100 MG: 100 CAPSULE, LIQUID FILLED ORAL at 08:32

## 2017-06-09 RX ADMIN — LEVOTHYROXINE SODIUM 150 MCG: 150 TABLET ORAL at 08:32

## 2017-06-09 RX ADMIN — ROSUVASTATIN CALCIUM 20 MG: 20 TABLET, FILM COATED ORAL at 08:32

## 2017-06-09 RX ADMIN — Medication 30 ML: at 09:41

## 2017-06-09 RX ADMIN — Medication 30 ML: at 11:38

## 2017-06-09 RX ADMIN — ASPIRIN 81 MG 81 MG: 81 TABLET ORAL at 08:32

## 2017-06-09 RX ADMIN — HEPARIN SODIUM 5000 UNITS: 5000 INJECTION, SOLUTION INTRAVENOUS; SUBCUTANEOUS at 14:04

## 2017-06-09 RX ADMIN — HEPARIN SODIUM 5000 UNITS: 5000 INJECTION, SOLUTION INTRAVENOUS; SUBCUTANEOUS at 05:31

## 2017-06-09 NOTE — PROGRESS NOTES
"Rusty Swanson  1960  [id number]    Surgery Progress Note    Date of visit: 6/9/2017    Subjective   Subjective: Pt feels OK, pain controlled. Tolerating PO.         Objective     Objective    /70 (BP Location: Right arm, Patient Position: Lying)  Pulse 70  Temp 98.2 °F (36.8 °C) (Oral)   Resp 16  Ht 69\" (175.3 cm)  Wt 220 lb (99.8 kg)  SpO2 98%  BMI 32.49 kg/m2    Intake/Output Summary (Last 24 hours) at 06/09/17 0738  Last data filed at 06/09/17 0600   Gross per 24 hour   Intake             1400 ml   Output             4455 ml   Net            -3055 ml       CV:  Rhythm  regular and rate regular   L:  Clear  to auscultation bilaterally   Abd:  Bowel sounds positive , soft, appropriately tender. Dressings, PEG c/d/i  Ext:  No cyanosis, clubbing, edema    Labs that are back at this time have been reviewed.        Assessment/Plan     Assessment/ Plan:    Hospital Problem List     Paraesophageal hernia - Doing well after repair. D/C PCA, UGI today. Teaching about diet. Possible home today vs. Tomorrow.                    Harshal Vasquez MD  6/9/2017  7:38 AM    UGI looks good. Pain well controlled. HGB OK, chronic anemia, no evidence of bleeding. Will D/C home. RTC 4 weeks.    Harshal Vasquez MD  2:26 PM    "

## 2017-06-09 NOTE — PLAN OF CARE
Problem: Patient Care Overview (Adult)  Goal: Plan of Care Review  Outcome: Ongoing (interventions implemented as appropriate)    06/09/17 1431   Coping/Psychosocial Response Interventions   Plan Of Care Reviewed With patient;spouse   Patient Care Overview   Progress improving         Problem: Perioperative Period (Adult)  Goal: Signs and Symptoms of Listed Potential Problems Will be Absent or Manageable (Perioperative Period)  Outcome: Ongoing (interventions implemented as appropriate)    06/09/17 1431   Perioperative Period   Problems Assessed (Perioperative Period) all   Problems Present (Perioperative Period) pain

## 2017-06-09 NOTE — CONSULTS
"Adult Nutrition  Assessment/PES    Patient Name:  Rusty Swanson  YOB: 1960  MRN: [id number]  Admit Date:  6/8/2017    Assessment Date:  6/9/2017        Reason for Assessment       06/09/17 0806    Reason for Assessment    Reason For Assessment/Visit education    Time Spent (min) 30    Diagnosis Diagnosis    Cardiac Dyslipidemia    Endocrine Hypothyroid    Gastrointestinal GERD/Reflux   PE hernia s/p Toupet Fundoplication with mesh, EGD with PEG, extensive NICK 6-8-17    Substance Use Tobacco        Patient Active Problem List   Diagnosis   • Paraesophageal hernia             Anthropometrics       06/09/17 0810    Anthropometrics (Special Considerations)    Height Used for Calculations 1.753 m (5' 9\")    Weight Used for Calculations 99.8 kg (220 lb)    RD Calculated BMI (kg/m2) 32          Results from last 7 days  Lab Units 06/09/17  0624 06/05/17  1706   SODIUM mmol/L 138 139   POTASSIUM mmol/L 4.0 4.4   CHLORIDE mmol/L 105 109   TOTAL CO2 mmol/L 27.0 25.0   BUN mg/dL 12 15   CREATININE mg/dL 0.80 0.90   CALCIUM mg/dL 9.1 9.3   BILIRUBIN mg/dL  --  0.3   ALK PHOS U/L  --  122*   ALT (SGPT) U/L  --  30   AST (SGOT) U/L  --  28   GLUCOSE mg/dL 119* 91           Labs/Tests/Procedures/Meds       06/09/17 0811    Labs/Tests/Procedures/Meds    Labs/Tests Review Reviewed            Physical Findings       06/09/17 0811    Physical Findings/Assessment    Additional Documentation Physical Appearance (Group)   pt reports no N/V, has not had breakfast yet    Physical Appearance    Tubes peg tube   4455ml from PEG past 24 hours              Nutrition Prescription Ordered       06/09/17 0812    Nutrition Prescription PO    Current PO Diet Post Fundoplication;Clear Liquid    Post Fundoplication Diet Stage Stage 1 Clear liquid            Evaluation of Received Nutrient/Fluid Intake       06/09/17 0812    PO Evaluation    Number of Days PO Intake Evaluated Insufficient Data              Problem/Interventions:      "   Problem 1       06/09/17 0805    Nutrition Diagnoses Problem 1    Problem 1 Knowledge Deficit    Etiology (related to) --   paraesophageal hernia s/p Toupet Fundoplication     Signs/Symptoms (evidenced by) Clear Liquid Diet                    Intervention Goal       06/09/17 0812    Intervention Goal    General Nutrition support treatment    PO Establish PO            Nutrition Intervention       06/09/17 0812    Nutrition Intervention    RD/Tech Action Supplement provided;Follow Tx progress            Nutrition Prescription       06/09/17 0813    Nutrition Prescription PO    PO Prescription Begin/change supplement    Supplement Boost Breeze    Supplement Frequency 3 times a day            Education/Evaluation       06/09/17 0813    Education    Education Provided education regarding;Education topics   Postfundoplication  Diet Education    Provided education regarding Other (comment);Avoidance/improvement of symptoms    Monitor/Evaluation    Monitor Per protocol;I&O;PO intake;Pertinent labs;Weight;Skin status;Symptoms          Electronically signed by:  Caryl Ramires RD  06/09/17 8:15 AM

## 2017-06-09 NOTE — PLAN OF CARE
Problem: Patient Care Overview (Adult)  Goal: Plan of Care Review  Outcome: Ongoing (interventions implemented as appropriate)    06/09/17 0431   Coping/Psychosocial Response Interventions   Plan Of Care Reviewed With patient   Patient Care Overview   Progress progress toward functional goals is gradual       Goal: Adult Individualization and Mutuality  Outcome: Ongoing (interventions implemented as appropriate)  Goal: Discharge Needs Assessment  Outcome: Ongoing (interventions implemented as appropriate)    Problem: Perioperative Period (Adult)  Goal: Signs and Symptoms of Listed Potential Problems Will be Absent or Manageable (Perioperative Period)  Outcome: Ongoing (interventions implemented as appropriate)    06/09/17 0431   Perioperative Period   Problems Assessed (Perioperative Period) all   Problems Present (Perioperative Period) pain

## 2017-06-09 NOTE — PROGRESS NOTES
Discharge Planning Assessment  [facility]     Patient Name: Rusty Swanson  MRN: [id number]  Today's Date: 6/9/2017    Admit Date: 6/8/2017          Discharge Needs Assessment       06/09/17 1224    Living Environment    Lives With spouse    Living Arrangements house    Home Accessibility no concerns    Transportation Available car;family or friend will provide    Living Environment    Provides Primary Care For no one    Quality Of Family Relationships supportive    Able to Return to Prior Living Arrangements yes    Discharge Needs Assessment    Concerns To Be Addressed denies needs/concerns at this time;no discharge needs identified    Readmission Within The Last 30 Days no previous admission in last 30 days    Anticipated Changes Related to Illness none    Equipment Currently Used at Home none    Equipment Needed After Discharge none    Discharge Disposition home or self-care    Discharge Contact Information if Applicable [phone]    Discharge Planning Comments home with wife to [facility]            Discharge Plan       06/09/17 1225    Case Management/Social Work Plan    Plan home with wife to [facility]    Patient/Family In Agreement With Plan yes    Additional Comments Mr. Swanson lives with his wife in [facility]). He was independent prior to this admission. PTA he had no Home health or DME services involved. States he has ample support at home from wife and family. Currently he has no concerns or needs identified at this time, just anxious to be discharged.         Discharge Placement     No information found        Expected Discharge Date and Time     Expected Discharge Date Expected Discharge Time    Jun 9, 2017               Demographic Summary       06/09/17 1223    Referral Information    Admission Type outpatient in a bed    Arrived From home or self-care    Referral Source admission list    Record Reviewed clinical discipline documentation;history and physical;medical  record    Contact Information    Permission Granted to Share Information With     Primary Care Physician Information    Name dr. amaya            Functional Status       06/09/17 1223    Functional Status Current    Ambulation 0-->independent    Transferring 0-->independent    Toileting 0-->independent    Bathing 0-->independent    Dressing 0-->independent    Eating 0-->independent    Communication 0-->understands/communicates without difficulty    Swallowing (if score 2 or more for any item, consult Rehab Services) 0-->swallows foods/liquids without difficulty    Change in Functional Status Since Onset of Current Illness/Injury no    Functional Status Prior    Ambulation 0-->independent    Transferring 0-->independent    Toileting 0-->independent    Bathing 0-->independent    Dressing 0-->independent    Eating 0-->independent    Communication 0-->understands/communicates without difficulty    Swallowing 0-->swallows foods/liquids without difficulty    IADL    Medications independent   has rx drug coverage with no issues in obtaining or affording copays    Meal Preparation independent    Housekeeping independent    Laundry independent    Shopping independent    Oral Care independent    Activity Tolerance    Current Activity Limitations lifting restrictions    Usual Activity Tolerance good    Current Activity Tolerance good    Cognitive/Perceptual/Developmental    Current Mental Status/Cognitive Functioning no deficits noted    Recent Changes in Mental Status/Cognitive Functioning no changes    Employment/Financial    Financial Concerns none   has [employer] insurance with no recent changes to coverage            Psychosocial     None            Abuse/Neglect     None            Legal     None            Substance Abuse       06/09/17 1224    Substance Use, Patient    Substance Use current tobacco use    Tobacco Type cigarettes, tobacco    Number of Cigarette Packs per Day 1    Readiness to Quit Tobacco Use  ready to quit   has rx for chantix at home already    Tobacco Cessation Education (provide if tobacco used with i the last 12 mos) yes    Date Smoking Cessation Information Given 06/09/17            Patient Forms     None          Ernestina Mello RN

## 2017-06-09 NOTE — DISCHARGE SUMMARY
DATE OF ADMISSION: 06/08/2017    DATE OF DISCHARGE: 06/09/2017    PRIMARY CARE PHYSICIAN: Rasta Hayden MD, [facility]     DISCHARGE DIAGNOSES:  1. Paraesophageal hernia.   2. Chronic anemia.   3. Tobacco abuse.     PROCEDURES PERFORMED:  1. Laparoscopic paraesophageal hernia repair with mesh.   2. Toupet fundoplication.  3. Esophagogastroduodenoscopy with percutaneous endoscopic gastrostomy placement all performed on 06/08/2017.    HISTORY OF PRESENT ILLNESS: The patient is a 56-year-old gentleman with a history of multiple medical problems who has a large paraesophageal hernia. After complete preoperative work-up he was deemed an operative candidate. The risks and benefits were discussed at length with the patient and his family and they agreed to proceed.     HOSPITAL COURSE: The patient came in as outpatient and underwent his operative procedure and was transferred to the floor. Postoperatively, he did well. His pain was initially controlled on IV PCA and transitioned to oral pain medication. He was started on post fundoplication clear liquid diet. He improved. On postoperative day #1, he underwent a Gastrografin swallow, which demonstrated an excellent technical result. He was taught by the nutritionist on appropriate dietary changes and was taught by the nursing staff on how to manage his PEG tube. He continued to do well and was ready for discharge home on 06/09/2017. The patient is tolerating a postfundoplication liquid diet without difficulty. He is having normal bowel and bladder function. Incisions remain clean, dry, intact, and well-healed. His laboratory and vital signs are stable and normal. His hemoglobin is slightly low at 7.8, but he has chronic anemia and his admission was only 8.5. If he has worsening problems, fever, chills, nausea, vomiting, or any other complaints he is to contact Dr. Vasquez’s office. A contact card has been provided. His PEG tube is clean, dry, intact, as are incisions.      DISCHARGE INSTRUCTIONS:  1 The patient is instructed to  follow up with Dr. Vasquez in 4 weeks' time.   2. He was instructed to refrain from lifting more than 15-20 pounds until return to the clinic. He is to flush his PEG tube twice daily with water and has been taught this by the nursing staff. He may remove his dressings tomorrow and may shower at that time. If h has worsening problems, fever, chills, nausea, vomiting, or other complaints he is to contact Dr. Vasquez’s office and a contact card has been provided. He understands and is willing to proceed.      Harshal Vasquez MD*  CLAUDIA/manisha  DD: 06/09/2017 14:30:33  DT: 06/09/2017 17:30:32  Voice Rec. ID #[id number]  Voice Original ID #[id number]  Doc ID #[id number]  Rev. #0  cc:  Rasta Hayden MD, [facility]

## 2019-10-30 ENCOUNTER — CONSULT (OUTPATIENT)
Dept: SLEEP MEDICINE | Facility: HOSPITAL | Age: 59
End: 2019-10-30

## 2019-10-30 VITALS
BODY MASS INDEX: 32.64 KG/M2 | DIASTOLIC BLOOD PRESSURE: 85 MMHG | OXYGEN SATURATION: 95 % | HEART RATE: 85 BPM | HEIGHT: 69 IN | SYSTOLIC BLOOD PRESSURE: 132 MMHG | WEIGHT: 220.4 LBS

## 2019-10-30 DIAGNOSIS — R06.83 SNORING: Primary | ICD-10-CM

## 2019-10-30 DIAGNOSIS — G47.33 OBSTRUCTIVE SLEEP APNEA, ADULT: ICD-10-CM

## 2019-10-30 DIAGNOSIS — E66.09 CLASS 1 OBESITY DUE TO EXCESS CALORIES WITHOUT SERIOUS COMORBIDITY WITH BODY MASS INDEX (BMI) OF 32.0 TO 32.9 IN ADULT: ICD-10-CM

## 2019-10-30 PROCEDURE — 99203 OFFICE O/P NEW LOW 30 MIN: CPT | Performed by: INTERNAL MEDICINE

## 2019-10-30 RX ORDER — ATORVASTATIN CALCIUM 10 MG/1
10 TABLET, FILM COATED ORAL DAILY
COMMUNITY

## 2019-10-31 NOTE — PROGRESS NOTES
Subjective   Rusty Swanson is a 58 y.o. male is being seen for consultation today at the request of Kristopher Sanchez MD for the evaluation of snoring and nonrestorative sleep.    History of Present Illness  Patient states he has been told by his wife that he had snoring for at least 10 years.  He denies being told that he had apneas.  He denies awakening gasping for breath.  He does admit to feeling tired in the morning.  He denies morning headaches.  He will fall asleep if sitting quietly during the day.  He denies problems while driving.    He has a history of loud snoring and snores in all positions.  He is awakened with a dry mouth and with a sore throat.  He has broken his nose in the past but denies having trouble breathing through his nose or having surgery on his nose.  He has occasional reflux symptoms but had surgery for that.  He denies hypnagogue hallucinations sleep paralysis.  He denies kicking or jerking his legs at night.  He does have occasional back pain but does not think it keeps him awake.  He complains of occasional leg cramps.  He denies any recent weight change.    He works second shift and does not get to bed until 1 to 1:30 in the morning.  He thinks it takes him 30 to 45 minutes to fall asleep.  He is not aware of awakening.  He thinks he gets 6 to 7 hours of sleep but still feels tired arising at 9 AM.  He denies any history of hypertension diabetes coronary artery disease.  He did apparently have a TIA in the summer and was transiently weak on his right side.  He has a history of hypothyroidism.  No Known Allergies       Current Outpatient Medications:   •  aspirin 81 MG chewable tablet, Chew 81 mg Daily. STOPPED 6/5/17 FOR UPCOMING SURGERY, Disp: , Rfl:   •  atorvastatin (LIPITOR) 10 MG tablet, Take 10 mg by mouth Daily., Disp: , Rfl:   •  Levothyroxine Sodium (SYNTHROID PO), Take 1 tablet/day by mouth Daily., Disp: , Rfl:   •  docusate (COLACE) 50 MG/5ML liquid, 10ml PO BID, Disp:  200 mL, Rfl: 0  •  promethazine (PHENERGAN) 6.25 MG/5ML syrup, Take 10 mL by mouth Every 6 (Six) Hours As Needed for Nausea or Vomiting., Disp: 300 mL, Rfl: 0  •  Rosuvastatin Calcium (CRESTOR PO), Take 1 tablet by mouth Daily., Disp: , Rfl:     Social History    Tobacco Use      Smoking status: Current Every Day Smoker        Packs/day: 1.00        Years: 35.00        Pack years: 35        Types: Cigarettes      Smokeless tobacco: Never Used       Social History     Substance and Sexual Activity   Alcohol Use No       Caffeine: He has 2 cups of coffee per day and may have 3 chacha per week    Past Medical History:   Diagnosis Date   • Anemia    • Disease of thyroid gland    • GERD (gastroesophageal reflux disease)    • Smoker    • Wears glasses        Past Surgical History:   Procedure Laterality Date   • BACK SURGERY     • ESOPHAGEAL MOTILITY STUDY N/A 4/18/2017    Procedure: ESOPHAGEAL MOTILITY STUDY;  Surgeon: Harshal Vasquez MD;  Location:  [facility];  Service:    • HAND SURGERY     • HERNIA REPAIR     • PARAESOPHAGEAL HERNIA REPAIR N/A 6/8/2017    Procedure: PARAESOPHAGEAL HERNIA REPAIR LAPAROSCOPIC WITH MESH, TOUPET, EGD, PEG, LYSIS OF ADHESIONS, FUNDOPLICATION ;  Surgeon: Harshal Vasquez MD;  Location:  [facility];  Service:        Family History   Problem Relation Age of Onset   • Leukemia Father        The following portions of the patient's history were reviewed and updated as appropriate: allergies, current medications, past family history, past medical history, past social history, past surgical history and problem list.    Review of Systems   Constitutional: Positive for fatigue.   HENT: Positive for hearing loss, sinus pressure and tinnitus.    Eyes: Negative.    Respiratory: Negative.    Cardiovascular: Negative.    Gastrointestinal: Negative.    Endocrine: Positive for cold intolerance and heat intolerance.   Genitourinary: Negative.    Musculoskeletal: Positive for arthralgias, back pain and  "myalgias.   Skin: Negative.    Allergic/Immunologic: Negative.    Neurological:        Has history of TIA   Hematological: Negative.    Psychiatric/Behavioral: Negative.    [place] score is 14/24    Objective     /85   Pulse 85   Ht 175.3 cm (69\")   Wt 100 kg (220 lb 6.4 oz)   SpO2 95%   BMI 32.55 kg/m²      Physical Exam   Constitutional: He is oriented to person, place, and time. He appears well-developed and well-nourished.   He is obese.   HENT:   Head: Normocephalic and atraumatic.   He has nasal airway narrowing with nasal septal deviation the right.   Eyes: EOM are normal. Pupils are equal, round, and reactive to light.   Neck: Normal range of motion. Neck supple.   Cardiovascular: Normal rate, regular rhythm and normal heart sounds.   Pulmonary/Chest: Effort normal and breath sounds normal.   Abdominal: Soft. Bowel sounds are normal.   Musculoskeletal: Normal range of motion. He exhibits no edema.   Neurological: He is alert and oriented to person, place, and time.   He has symmetrical  strength   Skin: Skin is warm and dry.   Psychiatric: He has a normal mood and affect. His behavior is normal.         Assessment/Plan   Rusty was seen today for sleeping problem.    Diagnoses and all orders for this visit:    Snoring  -     Home Sleep Study; Future    Obstructive sleep apnea, adult  -     Home Sleep Study; Future    Class 1 obesity due to excess calories without serious comorbidity with body mass index (BMI) of 32.0 to 32.9 in adult    Patient has a history of snoring and nonrestorative sleep.  I think is an excellent story for obstructive sleep apnea.  We will plan to proceed to home sleep testing.  I have discussed possible therapies including CPAP, weight control, oral appliance, and surgery.  We have also discussed the long-term consequences of untreated obstructive sleep apnea.  He is encouraged to lose weight.  He is encouraged to avoid alcohol and sedatives close to bedtime.  He is " encouraged to practice lateral position sleep.  We will see him back after the study.         Mac Perez MD [facility]  Sleep Medicine  Pulmonary and Critical Care Medicine

## 2019-12-05 ENCOUNTER — HOSPITAL ENCOUNTER (OUTPATIENT)
Dept: SLEEP MEDICINE | Facility: HOSPITAL | Age: 59
Discharge: HOME | End: 2019-12-05
Admitting: INTERNAL MEDICINE

## 2019-12-05 VITALS
WEIGHT: 223.2 LBS | SYSTOLIC BLOOD PRESSURE: 132 MMHG | OXYGEN SATURATION: 97 % | BODY MASS INDEX: 33.06 KG/M2 | HEIGHT: 69 IN | HEART RATE: 85 BPM | DIASTOLIC BLOOD PRESSURE: 78 MMHG

## 2019-12-05 DIAGNOSIS — G47.33 OBSTRUCTIVE SLEEP APNEA, ADULT: ICD-10-CM

## 2019-12-05 DIAGNOSIS — R06.83 SNORING: ICD-10-CM

## 2019-12-05 PROCEDURE — 95806 SLEEP STUDY UNATT&RESP EFFT: CPT

## 2019-12-06 PROCEDURE — 95806 SLEEP STUDY UNATT&RESP EFFT: CPT | Performed by: INTERNAL MEDICINE

## 2019-12-11 DIAGNOSIS — G47.33 OBSTRUCTIVE SLEEP APNEA, ADULT: Primary | ICD-10-CM

## 2019-12-11 DIAGNOSIS — E66.09 CLASS 1 OBESITY DUE TO EXCESS CALORIES WITHOUT SERIOUS COMORBIDITY WITH BODY MASS INDEX (BMI) OF 32.0 TO 32.9 IN ADULT: ICD-10-CM

## 2019-12-11 DIAGNOSIS — R06.83 SNORING: ICD-10-CM

## 2019-12-20 ENCOUNTER — OFFICE VISIT (OUTPATIENT)
Dept: SLEEP MEDICINE | Facility: HOSPITAL | Age: 59
End: 2019-12-20

## 2019-12-20 VITALS
WEIGHT: 225.4 LBS | OXYGEN SATURATION: 94 % | SYSTOLIC BLOOD PRESSURE: 145 MMHG | DIASTOLIC BLOOD PRESSURE: 82 MMHG | HEIGHT: 69 IN | BODY MASS INDEX: 33.38 KG/M2 | HEART RATE: 85 BPM

## 2019-12-20 DIAGNOSIS — G47.33 OSA (OBSTRUCTIVE SLEEP APNEA): Primary | ICD-10-CM

## 2019-12-20 PROCEDURE — 99213 OFFICE O/P EST LOW 20 MIN: CPT | Performed by: NURSE PRACTITIONER

## 2019-12-20 NOTE — PROGRESS NOTES
Chief Complaint:   Chief Complaint   Patient presents with   • Follow-up       HPI:    Rusty Swanson is a 59 y.o. male here for follow-up of sleep study results.  Patient was seen here in consult 10/30/2019 for snoring and excessive daytime sleepiness.  Patient sleeps 6 to 7 hours nightly and does not feel refreshed upon awakening.  Patient complains of excessive daytime sleepiness.  Patient has an [place] score of 14/24.  Patient had sleep study 12/6/2019 that showed moderate obstructive sleep apnea that did increase to severe with an AHI of 36.4 when on his left side.  Patient understands consequences of untreated sleep apnea as well as different therapies available to him.  Patient wishes to initiate CPAP therapy.        Current medications are:   Current Outpatient Medications:   •  aspirin 81 MG chewable tablet, Chew 81 mg Daily. STOPPED 6/5/17 FOR UPCOMING SURGERY, Disp: , Rfl:   •  atorvastatin (LIPITOR) 10 MG tablet, Take 10 mg by mouth Daily., Disp: , Rfl:   •  Levothyroxine Sodium (SYNTHROID PO), Take 1 tablet/day by mouth Daily., Disp: , Rfl:   •  Rosuvastatin Calcium (CRESTOR PO), Take 1 tablet by mouth Daily., Disp: , Rfl:   •  docusate (COLACE) 50 MG/5ML liquid, 10ml PO BID, Disp: 200 mL, Rfl: 0  •  promethazine (PHENERGAN) 6.25 MG/5ML syrup, Take 10 mL by mouth Every 6 (Six) Hours As Needed for Nausea or Vomiting., Disp: 300 mL, Rfl: 0.      The patient's relevant past medical, surgical, family and social history were reviewed and updated in Epic as appropriate.       Review of Systems   Constitutional: Positive for fatigue.   Eyes: Positive for visual disturbance.   Respiratory: Positive for apnea.    Gastrointestinal: Positive for constipation.   Psychiatric/Behavioral: Positive for sleep disturbance.   All other systems reviewed and are negative.        Objective:    Physical Exam   Constitutional: He is oriented to person, place, and time. He appears well-developed and well-nourished.    HENT:   Head: Normocephalic and atraumatic.   Mouth/Throat: Oropharynx is clear and moist.   Mallampati 3 anatomy   Eyes: Conjunctivae are normal.   Neck: Neck supple. No thyromegaly present.   Cardiovascular: Normal rate and regular rhythm.   Pulmonary/Chest: Effort normal and breath sounds normal.   Lymphadenopathy:     He has no cervical adenopathy.   Neurological: He is alert and oriented to person, place, and time.   Skin: Skin is warm and dry.   Psychiatric: He has a normal mood and affect. His behavior is normal. Judgment and thought content normal.   Nursing note and vitals reviewed.        ASSESSMENT/PLAN    Rusty was seen today for follow-up.    Diagnoses and all orders for this visit:    SUKHI (obstructive sleep apnea)            1. Counseled patient regarding multimodal approach with healthy nutrition, healthy sleep, regular physical activity, social activities, counseling, and medications. Encouraged to practice lateral sleep position. Avoid alcohol and sedatives close to bedtime.  2.   Order to initiate CPAP therapy faxed to DME of patient's choosing.  I will see patient back in 31 to 90 days.  I have reviewed the results of my evaluation and impression and discussed my recommendations in detail with the patient.      Signed by  Coleen Gonzalez, EDIL    December 20, 2019      CC: Kristopher Sanchez MD          No ref. provider found

## 2019-12-20 NOTE — PATIENT INSTRUCTIONS
Sleep Apnea  Sleep apnea affects breathing during sleep. It causes breathing to stop for a short time or to become shallow. It can also increase the risk of:  · Heart attack.  · Stroke.  · Being very overweight (obese).  · Diabetes.  · Heart failure.  · Irregular heartbeat.  The goal of treatment is to help you breathe normally again.  What are the causes?  There are three kinds of sleep apnea:  · Obstructive sleep apnea. This is caused by a blocked or collapsed airway.  · Central sleep apnea. This happens when the brain does not send the right signals to the muscles that control breathing.  · Mixed sleep apnea. This is a combination of obstructive and central sleep apnea.  The most common cause of this condition is a collapsed or blocked airway. This can happen if:  · Your throat muscles are too relaxed.  · Your tongue and tonsils are too large.  · You are overweight.  · Your airway is too small.  What increases the risk?  · Being overweight.  · Smoking.  · Having a small airway.  · Being older.  · Being male.  · Drinking alcohol.  · Taking medicines to calm yourself (sedatives or tranquilizers).  · Having family members with the condition.  What are the signs or symptoms?  · Trouble staying asleep.  · Being sleepy or tired during the day.  · Getting angry a lot.  · Loud snoring.  · Headaches in the morning.  · Not being able to focus your mind (concentrate).  · Forgetting things.  · Less interest in sex.  · Mood swings.  · Personality changes.  · Feelings of sadness (depression).  · Waking up a lot during the night to pee (urinate).  · Dry mouth.  · Sore throat.  How is this diagnosed?  · Your medical history.  · A physical exam.  · A test that is done when you are sleeping (sleep study). The test is most often done in a sleep lab but may also be done at home.  How is this treated?    · Sleeping on your side.  · Using a medicine to get rid of mucus in your nose (decongestant).  · Avoiding the use of alcohol,  medicines to help you relax, or certain pain medicines (narcotics).  · Losing weight, if needed.  · Changing your diet.  · Not smoking.  · Using a machine to open your airway while you sleep, such as:  ? An oral appliance. This is a mouthpiece that shifts your lower jaw forward.  ? A CPAP device. This device blows air through a mask when you breathe out (exhale).  ? An EPAP device. This has valves that you put in each nostril.  ? A BPAP device. This device blows air through a mask when you breathe in (inhale) and breathe out.  · Having surgery if other treatments do not work.  It is important to get treatment for sleep apnea. Without treatment, it can lead to:  · High blood pressure.  · Coronary artery disease.  · In men, not being able to have an erection (impotence).  · Reduced thinking ability.  Follow these instructions at home:  Lifestyle  · Make changes that your doctor recommends.  · Eat a healthy diet.  · Lose weight if needed.  · Avoid alcohol, medicines to help you relax, and some pain medicines.  · Do not use any products that contain nicotine or tobacco, such as cigarettes, e-cigarettes, and chewing tobacco. If you need help quitting, ask your doctor.  General instructions  · Take over-the-counter and prescription medicines only as told by your doctor.  · If you were given a machine to use while you sleep, use it only as told by your doctor.  · If you are having surgery, make sure to tell your doctor you have sleep apnea. You may need to bring your device with you.  · Keep all follow-up visits as told by your doctor. This is important.  Contact a doctor if:  · The machine that you were given to use during sleep bothers you or does not seem to be working.  · You do not get better.  · You get worse.  Get help right away if:  · Your chest hurts.  · You have trouble breathing in enough air.  · You have an uncomfortable feeling in your back, arms, or stomach.  · You have trouble talking.  · One side of your  body feels weak.  · A part of your face is hanging down.  These symptoms may be an emergency. Do not wait to see if the symptoms will go away. Get medical help right away. Call your local emergency services (911 in the [place] Do not drive yourself to the hospital.  Summary  · This condition affects breathing during sleep.  · The most common cause is a collapsed or blocked airway.  · The goal of treatment is to help you breathe normally while you sleep.  This information is not intended to replace advice given to you by your health care provider. Make sure you discuss any questions you have with your health care provider.  Document Released: 09/26/2009 Document Revised: 08/13/2019 Document Reviewed: 08/13/2019  [employer] Interactive Patient Education © 2019 [employer]

## 2020-02-21 ENCOUNTER — OFFICE VISIT (OUTPATIENT)
Dept: SLEEP MEDICINE | Facility: HOSPITAL | Age: 60
End: 2020-02-21

## 2020-02-21 VITALS
HEIGHT: 69 IN | BODY MASS INDEX: 33.92 KG/M2 | WEIGHT: 229 LBS | SYSTOLIC BLOOD PRESSURE: 136 MMHG | DIASTOLIC BLOOD PRESSURE: 79 MMHG | HEART RATE: 77 BPM | OXYGEN SATURATION: 94 %

## 2020-02-21 DIAGNOSIS — G47.33 OSA (OBSTRUCTIVE SLEEP APNEA): Primary | ICD-10-CM

## 2020-02-21 PROCEDURE — 99212 OFFICE O/P EST SF 10 MIN: CPT | Performed by: NURSE PRACTITIONER

## 2020-02-21 NOTE — PROGRESS NOTES
Chief Complaint:   Chief Complaint   Patient presents with   • Follow-up       HPI:    Rusty Swanson is a 59 y.o. male here for follow-up of sleep apnea.  Patient was last seen December 2019 and did start CPAP therapy after sleep study 12/6/2019 showed moderate obstructive sleep apnea did increase to severe when on his left side.  Patient states he is doing well with CPAP therapy.  Patient has recently changed masks and feels he is doing better since doing this.  Patient is sleeping 6 to 7 hours nightly and feels refreshed upon awakening.  Patient has an [place] score of 9/24.  Patient is noncompliant today but does think this would not be a problem to him now that he has any mass.        Current medications are:   Current Outpatient Medications:   •  aspirin 81 MG chewable tablet, Chew 81 mg Daily. STOPPED 6/5/17 FOR UPCOMING SURGERY, Disp: , Rfl:   •  atorvastatin (LIPITOR) 10 MG tablet, Take 10 mg by mouth Daily., Disp: , Rfl:   •  docusate (COLACE) 50 MG/5ML liquid, 10ml PO BID, Disp: 200 mL, Rfl: 0  •  Levothyroxine Sodium (SYNTHROID PO), Take 1 tablet/day by mouth Daily., Disp: , Rfl:   •  promethazine (PHENERGAN) 6.25 MG/5ML syrup, Take 10 mL by mouth Every 6 (Six) Hours As Needed for Nausea or Vomiting., Disp: 300 mL, Rfl: 0  •  Rosuvastatin Calcium (CRESTOR PO), Take 1 tablet by mouth Daily., Disp: , Rfl: .      The patient's relevant past medical, surgical, family and social history were reviewed and updated in Epic as appropriate.       Review of Systems   Eyes: Positive for visual disturbance.   Respiratory: Positive for apnea.    Psychiatric/Behavioral: Positive for sleep disturbance.   All other systems reviewed and are negative.        Objective:    Physical Exam   Constitutional: He is oriented to person, place, and time. He appears well-developed and well-nourished.   HENT:   Head: Normocephalic and atraumatic.   Mouth/Throat: Oropharynx is clear and moist.   Class 3 airway   Eyes:  Conjunctivae are normal.   Neck: Neck supple.   Cardiovascular: Normal rate and regular rhythm.   Pulmonary/Chest: Effort normal and breath sounds normal.   Neurological: He is alert and oriented to person, place, and time.   Skin: Skin is warm and dry.   Psychiatric: He has a normal mood and affect. His behavior is normal. Judgment and thought content normal.   Nursing note and vitals reviewed.    19/22 days of use.  Greater than 4-hour use 31.8%.  90% pressure 9.2.  AHI 4.2.  Download reviewed with patient.    ASSESSMENT/PLAN    Rusty was seen today for follow-up.    Diagnoses and all orders for this visit:    SUKHI (obstructive sleep apnea)  -     CPAP Therapy            1. Counseled patient regarding multimodal approach with healthy nutrition, healthy sleep, regular physical activity, social activities, counseling, and medications. Encouraged to practice lateral sleep position. Avoid alcohol and sedatives close to bedtime.  2. Renew supplies x1 year.  Return to clinic 1 year or sooner symptoms warrant.    I have reviewed the results of my evaluation and impression and discussed my recommendations in detail with the patient.      Signed by  EDIL Lopez    February 21, 2020      CC: Kristopher Sanchez MD          No ref. provider found

## 2020-10-18 ENCOUNTER — APPOINTMENT (OUTPATIENT)
Dept: PREADMISSION TESTING | Facility: HOSPITAL | Age: 60
End: 2020-10-18

## 2020-10-18 ENCOUNTER — HOSPITAL ENCOUNTER (OUTPATIENT)
Dept: GENERAL RADIOLOGY | Facility: HOSPITAL | Age: 60
Discharge: HOME | End: 2020-10-18

## 2020-10-18 VITALS — WEIGHT: 218 LBS | HEIGHT: 69 IN | BODY MASS INDEX: 32.29 KG/M2

## 2020-10-18 LAB
ALBUMIN SERPL-MCNC: 4.4 G/DL (ref 3.5–5.2)
ALBUMIN/GLOB SERPL: 1.6 G/DL
ALP SERPL-CCNC: 197 U/L (ref 39–117)
ALT SERPL W P-5'-P-CCNC: 41 U/L (ref 1–41)
ANION GAP SERPL CALCULATED.3IONS-SCNC: 8 MMOL/L (ref 5–15)
AST SERPL-CCNC: 32 U/L (ref 1–40)
BILIRUB SERPL-MCNC: 0.9 MG/DL (ref 0–1.2)
BUN SERPL-MCNC: 14 MG/DL (ref 6–20)
BUN/CREAT SERPL: 14.1 (ref 7–25)
CALCIUM SPEC-SCNC: 9.7 MG/DL (ref 8.6–10.5)
CHLORIDE SERPL-SCNC: 103 MMOL/L (ref 98–107)
CO2 SERPL-SCNC: 27 MMOL/L (ref 22–29)
CREAT SERPL-MCNC: 0.99 MG/DL (ref 0.76–1.27)
DEPRECATED RDW RBC AUTO: 45.5 FL (ref 37–54)
ERYTHROCYTE [DISTWIDTH] IN BLOOD BY AUTOMATED COUNT: 13.1 % (ref 12.3–15.4)
GFR SERPL CREATININE-BSD FRML MDRD: 78 ML/MIN/1.73
GLOBULIN UR ELPH-MCNC: 2.8 GM/DL
GLUCOSE SERPL-MCNC: 100 MG/DL (ref 65–99)
HCT VFR BLD AUTO: 52.1 % (ref 37.5–51)
HGB BLD-MCNC: 16.9 G/DL (ref 13–17.7)
MCH RBC QN AUTO: 30.6 PG (ref 26.6–33)
MCHC RBC AUTO-ENTMCNC: 32.4 G/DL (ref 31.5–35.7)
MCV RBC AUTO: 94.2 FL (ref 79–97)
PLATELET # BLD AUTO: 244 10*3/MM3 (ref 140–450)
PMV BLD AUTO: 8.8 FL (ref 6–12)
POTASSIUM SERPL-SCNC: 4.9 MMOL/L (ref 3.5–5.2)
PROT SERPL-MCNC: 7.2 G/DL (ref 6–8.5)
RBC # BLD AUTO: 5.53 10*6/MM3 (ref 4.14–5.8)
SODIUM SERPL-SCNC: 138 MMOL/L (ref 136–145)
WBC # BLD AUTO: 6.92 10*3/MM3 (ref 3.4–10.8)

## 2020-10-18 PROCEDURE — U0004 COV-19 TEST NON-CDC HGH THRU: HCPCS

## 2020-10-18 PROCEDURE — 93005 ELECTROCARDIOGRAM TRACING: CPT

## 2020-10-18 PROCEDURE — 71046 X-RAY EXAM CHEST 2 VIEWS: CPT

## 2020-10-18 PROCEDURE — 80053 COMPREHEN METABOLIC PANEL: CPT | Performed by: SURGERY

## 2020-10-18 PROCEDURE — 93010 ELECTROCARDIOGRAM REPORT: CPT | Performed by: INTERNAL MEDICINE

## 2020-10-18 PROCEDURE — C9803 HOPD COVID-19 SPEC COLLECT: HCPCS

## 2020-10-18 PROCEDURE — 85027 COMPLETE CBC AUTOMATED: CPT | Performed by: SURGERY

## 2020-10-18 PROCEDURE — 36415 COLL VENOUS BLD VENIPUNCTURE: CPT

## 2020-10-18 RX ORDER — PANTOPRAZOLE SODIUM 20 MG/1
20 TABLET, DELAYED RELEASE ORAL DAILY
COMMUNITY

## 2020-10-18 NOTE — PAT
Patient to apply Chlorhexadine wipes  to surgical area (as instructed) the night before procedure and the AM of procedure. Wipes provided.    Pt sent to chest xray after PAT visit.

## 2020-10-19 LAB — SARS-COV-2 RNA RESP QL NAA+PROBE: NOT DETECTED

## 2020-10-20 ENCOUNTER — ANESTHESIA EVENT (OUTPATIENT)
Dept: PERIOP | Facility: HOSPITAL | Age: 60
End: 2020-10-20

## 2020-10-20 RX ORDER — FAMOTIDINE 10 MG/ML
20 INJECTION, SOLUTION INTRAVENOUS ONCE
Status: CANCELLED | OUTPATIENT
Start: 2020-10-20 | End: 2020-10-20

## 2020-10-21 ENCOUNTER — HOSPITAL ENCOUNTER (OUTPATIENT)
Facility: HOSPITAL | Age: 60
Discharge: HOME | End: 2020-10-21
Attending: SURGERY | Admitting: SURGERY

## 2020-10-21 ENCOUNTER — APPOINTMENT (OUTPATIENT)
Dept: GENERAL RADIOLOGY | Facility: HOSPITAL | Age: 60
End: 2020-10-21

## 2020-10-21 ENCOUNTER — ANESTHESIA (OUTPATIENT)
Dept: PERIOP | Facility: HOSPITAL | Age: 60
End: 2020-10-21

## 2020-10-21 VITALS
WEIGHT: 218 LBS | SYSTOLIC BLOOD PRESSURE: 120 MMHG | HEIGHT: 69 IN | BODY MASS INDEX: 32.29 KG/M2 | HEART RATE: 78 BPM | DIASTOLIC BLOOD PRESSURE: 85 MMHG | RESPIRATION RATE: 16 BRPM | TEMPERATURE: 97.9 F | OXYGEN SATURATION: 90 %

## 2020-10-21 DIAGNOSIS — K80.20 SYMPTOMATIC CHOLELITHIASIS: Primary | ICD-10-CM

## 2020-10-21 PROCEDURE — 25010000003 CEFAZOLIN IN DEXTROSE 2-4 GM/100ML-% SOLUTION: Performed by: SURGERY

## 2020-10-21 PROCEDURE — 88304 TISSUE EXAM BY PATHOLOGIST: CPT | Performed by: SURGERY

## 2020-10-21 PROCEDURE — 25010000002 FENTANYL CITRATE (PF) 100 MCG/2ML SOLUTION: Performed by: NURSE ANESTHETIST, CERTIFIED REGISTERED

## 2020-10-21 PROCEDURE — 25010000002 FENTANYL CITRATE (PF) 100 MCG/2ML SOLUTION: Performed by: ANESTHESIOLOGY

## 2020-10-21 PROCEDURE — 25010000002 IOPAMIDOL 61 % SOLUTION: Performed by: SURGERY

## 2020-10-21 PROCEDURE — 25010000003 LIDOCAINE 1 % SOLUTION: Performed by: ANESTHESIOLOGY

## 2020-10-21 PROCEDURE — 25010000002 PROPOFOL 10 MG/ML EMULSION: Performed by: ANESTHESIOLOGY

## 2020-10-21 PROCEDURE — 25010000002 NEOSTIGMINE 10 MG/10ML SOLUTION: Performed by: NURSE ANESTHETIST, CERTIFIED REGISTERED

## 2020-10-21 PROCEDURE — 25010000002 DEXAMETHASONE PER 1 MG: Performed by: NURSE ANESTHETIST, CERTIFIED REGISTERED

## 2020-10-21 PROCEDURE — 25010000002 ONDANSETRON PER 1 MG: Performed by: NURSE ANESTHETIST, CERTIFIED REGISTERED

## 2020-10-21 DEVICE — LIGACLIP 10-M/L, 10MM ENDOSCOPIC ROTATING MULTIPLE CLIP APPLIERS
Type: IMPLANTABLE DEVICE | Status: FUNCTIONAL
Brand: LIGACLIP

## 2020-10-21 RX ORDER — PROMETHAZINE HYDROCHLORIDE 12.5 MG/1
12.5 TABLET ORAL ONCE AS NEEDED
Status: DISCONTINUED | OUTPATIENT
Start: 2020-10-21 | End: 2020-10-21 | Stop reason: HOSPADM

## 2020-10-21 RX ORDER — CEFAZOLIN SODIUM 2 G/100ML
2 INJECTION, SOLUTION INTRAVENOUS ONCE
Status: COMPLETED | OUTPATIENT
Start: 2020-10-21 | End: 2020-10-21

## 2020-10-21 RX ORDER — FENTANYL CITRATE 50 UG/ML
50 INJECTION, SOLUTION INTRAMUSCULAR; INTRAVENOUS
Status: DISCONTINUED | OUTPATIENT
Start: 2020-10-21 | End: 2020-10-21 | Stop reason: HOSPADM

## 2020-10-21 RX ORDER — DEXAMETHASONE SODIUM PHOSPHATE 4 MG/ML
INJECTION, SOLUTION INTRA-ARTICULAR; INTRALESIONAL; INTRAMUSCULAR; INTRAVENOUS; SOFT TISSUE AS NEEDED
Status: DISCONTINUED | OUTPATIENT
Start: 2020-10-21 | End: 2020-10-21 | Stop reason: SURG

## 2020-10-21 RX ORDER — DOCUSATE SODIUM 100 MG/1
100 CAPSULE, LIQUID FILLED ORAL ONCE
Status: DISCONTINUED | OUTPATIENT
Start: 2020-10-21 | End: 2020-10-21 | Stop reason: HOSPADM

## 2020-10-21 RX ORDER — DOCUSATE SODIUM 250 MG
250 CAPSULE ORAL 2 TIMES DAILY
Qty: 20 CAPSULE | Refills: 0 | Status: SHIPPED | OUTPATIENT
Start: 2020-10-21

## 2020-10-21 RX ORDER — PROPOFOL 10 MG/ML
VIAL (ML) INTRAVENOUS AS NEEDED
Status: DISCONTINUED | OUTPATIENT
Start: 2020-10-21 | End: 2020-10-21 | Stop reason: SURG

## 2020-10-21 RX ORDER — DOCUSATE SODIUM 100 MG/1
100 CAPSULE, LIQUID FILLED ORAL ONCE
Status: COMPLETED | OUTPATIENT
Start: 2020-10-21 | End: 2020-10-21

## 2020-10-21 RX ORDER — FENTANYL CITRATE 50 UG/ML
INJECTION, SOLUTION INTRAMUSCULAR; INTRAVENOUS AS NEEDED
Status: DISCONTINUED | OUTPATIENT
Start: 2020-10-21 | End: 2020-10-21 | Stop reason: SURG

## 2020-10-21 RX ORDER — SODIUM CHLORIDE 0.9 % (FLUSH) 0.9 %
3 SYRINGE (ML) INJECTION EVERY 12 HOURS SCHEDULED
Status: DISCONTINUED | OUTPATIENT
Start: 2020-10-21 | End: 2020-10-21 | Stop reason: HOSPADM

## 2020-10-21 RX ORDER — NEOSTIGMINE METHYLSULFATE 1 MG/ML
INJECTION, SOLUTION INTRAVENOUS AS NEEDED
Status: DISCONTINUED | OUTPATIENT
Start: 2020-10-21 | End: 2020-10-21 | Stop reason: SURG

## 2020-10-21 RX ORDER — HYDROCODONE BITARTRATE AND ACETAMINOPHEN 5; 325 MG/1; MG/1
1 TABLET ORAL ONCE AS NEEDED
Status: DISCONTINUED | OUTPATIENT
Start: 2020-10-21 | End: 2020-10-21 | Stop reason: HOSPADM

## 2020-10-21 RX ORDER — HYDRALAZINE HYDROCHLORIDE 20 MG/ML
5 INJECTION INTRAMUSCULAR; INTRAVENOUS
Status: DISCONTINUED | OUTPATIENT
Start: 2020-10-21 | End: 2020-10-21 | Stop reason: HOSPADM

## 2020-10-21 RX ORDER — LIDOCAINE HYDROCHLORIDE 10 MG/ML
0.5 INJECTION, SOLUTION EPIDURAL; INFILTRATION; INTRACAUDAL; PERINEURAL ONCE AS NEEDED
Status: COMPLETED | OUTPATIENT
Start: 2020-10-21 | End: 2020-10-21

## 2020-10-21 RX ORDER — OXYCODONE HYDROCHLORIDE AND ACETAMINOPHEN 5; 325 MG/1; MG/1
1 TABLET ORAL EVERY 4 HOURS PRN
Qty: 17 TABLET | Refills: 0 | Status: SHIPPED | OUTPATIENT
Start: 2020-10-21

## 2020-10-21 RX ORDER — HYDROMORPHONE HYDROCHLORIDE 1 MG/ML
0.5 INJECTION, SOLUTION INTRAMUSCULAR; INTRAVENOUS; SUBCUTANEOUS
Status: DISCONTINUED | OUTPATIENT
Start: 2020-10-21 | End: 2020-10-21 | Stop reason: HOSPADM

## 2020-10-21 RX ORDER — SODIUM CHLORIDE 0.9 % (FLUSH) 0.9 %
10 SYRINGE (ML) INJECTION AS NEEDED
Status: DISCONTINUED | OUTPATIENT
Start: 2020-10-21 | End: 2020-10-21 | Stop reason: HOSPADM

## 2020-10-21 RX ORDER — SODIUM CHLORIDE, SODIUM LACTATE, POTASSIUM CHLORIDE, CALCIUM CHLORIDE 600; 310; 30; 20 MG/100ML; MG/100ML; MG/100ML; MG/100ML
INJECTION, SOLUTION INTRAVENOUS CONTINUOUS PRN
Status: DISCONTINUED | OUTPATIENT
Start: 2020-10-21 | End: 2020-10-21 | Stop reason: SURG

## 2020-10-21 RX ORDER — BUPIVACAINE HYDROCHLORIDE AND EPINEPHRINE 2.5; 5 MG/ML; UG/ML
INJECTION, SOLUTION EPIDURAL; INFILTRATION; INTRACAUDAL; PERINEURAL AS NEEDED
Status: DISCONTINUED | OUTPATIENT
Start: 2020-10-21 | End: 2020-10-21 | Stop reason: HOSPADM

## 2020-10-21 RX ORDER — ROCURONIUM BROMIDE 10 MG/ML
INJECTION, SOLUTION INTRAVENOUS AS NEEDED
Status: DISCONTINUED | OUTPATIENT
Start: 2020-10-21 | End: 2020-10-21 | Stop reason: SURG

## 2020-10-21 RX ORDER — LIDOCAINE HYDROCHLORIDE 10 MG/ML
INJECTION, SOLUTION INFILTRATION; PERINEURAL AS NEEDED
Status: DISCONTINUED | OUTPATIENT
Start: 2020-10-21 | End: 2020-10-21 | Stop reason: SURG

## 2020-10-21 RX ORDER — SODIUM CHLORIDE 0.9 % (FLUSH) 0.9 %
3-10 SYRINGE (ML) INJECTION AS NEEDED
Status: DISCONTINUED | OUTPATIENT
Start: 2020-10-21 | End: 2020-10-21 | Stop reason: HOSPADM

## 2020-10-21 RX ORDER — SODIUM CHLORIDE 0.9 % (FLUSH) 0.9 %
10 SYRINGE (ML) INJECTION EVERY 12 HOURS SCHEDULED
Status: DISCONTINUED | OUTPATIENT
Start: 2020-10-21 | End: 2020-10-21 | Stop reason: HOSPADM

## 2020-10-21 RX ORDER — SODIUM CHLORIDE 9 MG/ML
INJECTION, SOLUTION INTRAVENOUS AS NEEDED
Status: DISCONTINUED | OUTPATIENT
Start: 2020-10-21 | End: 2020-10-21 | Stop reason: HOSPADM

## 2020-10-21 RX ORDER — FAMOTIDINE 20 MG/1
20 TABLET, FILM COATED ORAL ONCE
Status: COMPLETED | OUTPATIENT
Start: 2020-10-21 | End: 2020-10-21

## 2020-10-21 RX ORDER — ONDANSETRON 2 MG/ML
INJECTION INTRAMUSCULAR; INTRAVENOUS AS NEEDED
Status: DISCONTINUED | OUTPATIENT
Start: 2020-10-21 | End: 2020-10-21 | Stop reason: SURG

## 2020-10-21 RX ORDER — IPRATROPIUM BROMIDE AND ALBUTEROL SULFATE 2.5; .5 MG/3ML; MG/3ML
3 SOLUTION RESPIRATORY (INHALATION) ONCE AS NEEDED
Status: DISCONTINUED | OUTPATIENT
Start: 2020-10-21 | End: 2020-10-21 | Stop reason: HOSPADM

## 2020-10-21 RX ORDER — ONDANSETRON 2 MG/ML
4 INJECTION INTRAMUSCULAR; INTRAVENOUS ONCE AS NEEDED
Status: DISCONTINUED | OUTPATIENT
Start: 2020-10-21 | End: 2020-10-21 | Stop reason: HOSPADM

## 2020-10-21 RX ORDER — LABETALOL HYDROCHLORIDE 5 MG/ML
5 INJECTION, SOLUTION INTRAVENOUS
Status: DISCONTINUED | OUTPATIENT
Start: 2020-10-21 | End: 2020-10-21 | Stop reason: HOSPADM

## 2020-10-21 RX ORDER — SODIUM CHLORIDE, SODIUM LACTATE, POTASSIUM CHLORIDE, CALCIUM CHLORIDE 600; 310; 30; 20 MG/100ML; MG/100ML; MG/100ML; MG/100ML
9 INJECTION, SOLUTION INTRAVENOUS CONTINUOUS
Status: DISCONTINUED | OUTPATIENT
Start: 2020-10-21 | End: 2020-10-21 | Stop reason: HOSPADM

## 2020-10-21 RX ORDER — OXYCODONE HYDROCHLORIDE AND ACETAMINOPHEN 5; 325 MG/1; MG/1
1 TABLET ORAL ONCE
Status: COMPLETED | OUTPATIENT
Start: 2020-10-21 | End: 2020-10-21

## 2020-10-21 RX ORDER — GLYCOPYRROLATE 0.2 MG/ML
INJECTION INTRAMUSCULAR; INTRAVENOUS AS NEEDED
Status: DISCONTINUED | OUTPATIENT
Start: 2020-10-21 | End: 2020-10-21 | Stop reason: SURG

## 2020-10-21 RX ORDER — NALOXONE HCL 0.4 MG/ML
0.4 VIAL (ML) INJECTION AS NEEDED
Status: DISCONTINUED | OUTPATIENT
Start: 2020-10-21 | End: 2020-10-21 | Stop reason: HOSPADM

## 2020-10-21 RX ADMIN — SODIUM CHLORIDE, POTASSIUM CHLORIDE, SODIUM LACTATE AND CALCIUM CHLORIDE 9 ML/HR: 600; 310; 30; 20 INJECTION, SOLUTION INTRAVENOUS at 11:42

## 2020-10-21 RX ADMIN — DEXAMETHASONE SODIUM PHOSPHATE 8 MG: 4 INJECTION, SOLUTION INTRAMUSCULAR; INTRAVENOUS at 12:50

## 2020-10-21 RX ADMIN — FENTANYL CITRATE 100 MCG: 50 INJECTION, SOLUTION INTRAMUSCULAR; INTRAVENOUS at 12:38

## 2020-10-21 RX ADMIN — GLYCOPYRROLATE 0.4 MG: 0.2 INJECTION INTRAMUSCULAR; INTRAVENOUS at 13:28

## 2020-10-21 RX ADMIN — LIDOCAINE HYDROCHLORIDE 0.2 ML: 10 INJECTION, SOLUTION EPIDURAL; INFILTRATION; INTRACAUDAL; PERINEURAL at 11:42

## 2020-10-21 RX ADMIN — LIDOCAINE HYDROCHLORIDE 50 MG: 10 INJECTION, SOLUTION INFILTRATION; PERINEURAL at 12:38

## 2020-10-21 RX ADMIN — PROPOFOL 200 MG: 10 INJECTION, EMULSION INTRAVENOUS at 12:38

## 2020-10-21 RX ADMIN — FENTANYL CITRATE 50 MCG: 0.05 INJECTION, SOLUTION INTRAMUSCULAR; INTRAVENOUS at 14:14

## 2020-10-21 RX ADMIN — SODIUM CHLORIDE, POTASSIUM CHLORIDE, SODIUM LACTATE AND CALCIUM CHLORIDE: 600; 310; 30; 20 INJECTION, SOLUTION INTRAVENOUS at 12:36

## 2020-10-21 RX ADMIN — FAMOTIDINE 20 MG: 20 TABLET, FILM COATED ORAL at 11:41

## 2020-10-21 RX ADMIN — NEOSTIGMINE 3 MG: 1 INJECTION INTRAVENOUS at 13:28

## 2020-10-21 RX ADMIN — FENTANYL CITRATE 50 MCG: 0.05 INJECTION, SOLUTION INTRAMUSCULAR; INTRAVENOUS at 13:59

## 2020-10-21 RX ADMIN — FENTANYL CITRATE 50 MCG: 0.05 INJECTION, SOLUTION INTRAMUSCULAR; INTRAVENOUS at 14:35

## 2020-10-21 RX ADMIN — ROCURONIUM BROMIDE 40 MG: 10 INJECTION INTRAVENOUS at 12:38

## 2020-10-21 RX ADMIN — OXYCODONE HYDROCHLORIDE AND ACETAMINOPHEN 1 TABLET: 5; 325 TABLET ORAL at 15:35

## 2020-10-21 RX ADMIN — DOCUSATE SODIUM 100 MG: 100 CAPSULE, LIQUID FILLED ORAL at 15:35

## 2020-10-21 RX ADMIN — CEFAZOLIN SODIUM 2 G: 2 INJECTION, SOLUTION INTRAVENOUS at 12:42

## 2020-10-21 RX ADMIN — ONDANSETRON 4 MG: 2 INJECTION INTRAMUSCULAR; INTRAVENOUS at 13:28

## 2020-10-21 NOTE — ANESTHESIA PREPROCEDURE EVALUATION
Anesthesia Evaluation                  Airway   Mallampati: II  Dental      Pulmonary    Cardiovascular         Neuro/Psych  GI/Hepatic/Renal/Endo    (+) obesity,  hiatal hernia,      Musculoskeletal     Abdominal    Substance History      OB/GYN          Other                        Anesthesia Plan    ASA 3     general     intravenous induction     Anesthetic plan, all risks, benefits, and alternatives have been provided, discussed and informed consent has been obtained with: patient.

## 2020-10-21 NOTE — OP NOTE
Operative Report    Patient Name:  Rusty Swanson  YOB: 1960  [id number]  10/21/2020      PREOPERATIVE DIAGNOSIS: Symptomatic cholelithiasis, history of reflux      POSTOPERATIVE DIAGNOSIS: Same        PROCEDURE PERFORMED:     1. Laparoscopic cholecystectomy  2. EGD       SURGEON: Harshal Vasquez MD      ASSISTANT: None        SPECIMENS: Gallbladder and contents        ANESTHESIA: General.        FINDINGS:     1. Gallbladder in standard positioning.  Cholangiogram could not be performed due to small caliber cystic duct which could not be cannulated.    2.  EGD with normal post-fundoplication anatomy.  The GE junction oppose the scope normally.  Z-line was regular at the 39 cm level.         INDICATIONS:      The patient is a 59 y.o. male with a history of abdominal pain, concerning for symptomatic cholelithiasis.  He also has a history of prior laparoscopic fundoplication.Pre-operative imaging confirmed the diagnosis. The risks and benefits of Laparoscopic cholecystectomy with cholangiography and EGD were discussed with the patient and their family and they agree to proceed.        DESCRIPTION OF PROCEDURE:     After obtaining informed consent, the patient was taken to the operating room and placed in the supine position. After appropriate DVT and antibiotic prophylaxis, general anesthesia was induced. The abdomen was prepped and draped in standard sterile fashion, and after infiltrating the skin with local anesthetic, a 12mm skin incision was made inferior to the right costal margin in the anterior axillary line.  An optically guided trocar was advanced without difficulty into the abdominal cavity.  The abdomen was insufflated with carbon dioxide gas to a pressure of 15 mmHg.  The laparoscope was advanced the trocar and the abdominal contents inspected.  There was no evidence of bowel, bladder, or visceral injury with entrance of the trocar.  There were adhesions of omentum to the patient's  "prior mesh repair that were not disturbed.  At this point, after infiltrating the skin with local anesthetic, a standard laparoscopic cholecystectomy trocar placement schema was followed, care taken to avoid his adhesions.     The gallbladder was grasped and elevated superiorly. Using meticulous blunt dissection , the cystic duct and cystic artery were bluntly dissected free of other structures and clearly identified using the \"Critical View\" technique. The cystic artery was then clipped twice proximally and once distally, and divided between the clips.     The cystic duct was then clipped at its junction with the infundibulum of the gallbladder, and transected across 50% of its circumference.  The cystic duct was of a very small caliber and despite clearly the seeing the lumen and dilating the lumen of the cystic duct, cholangiocatheter could not be placed within it.  Therefore, the cystic duct was ligated with a 2-0 silk suture tied laparoscopically, reinforced with hemoclips, and then divided.      The gallbladder was then dissected free of the gallbladder fossa using a combination of electrocautery and blunt dissection. The gallbladder was then placed in an Endo Catch bag, and removed from the inferiormost trocar site. It was inspected on the back table, correlated with intra-operative findings, and passed off as specimen.     The right upper quadrant was then inspected. The cystic duct and cystic artery stumps were intact without bleeding or biliary leak. The right upper quadrant was irrigated with saline until clear. The abdomen was deflated and reinsufflated to make sure pneumoperitoneum was not tamponading any bleeding and there was none.  Using a 0 Vicryl suture and a laparoscopic suture passer, a figure-of-eight suture was placed around the 12 mm subcostal trocar site fascia.  The abdomen was again irrigated with saline until clear, and all trocars removed under direct and laparoscopic visualization. The " fascia at the 12 mm trocar site was closed using the previously placed 0 Vicryl suture. The wounds were irrigated with normal saline, and closed in each area using absorbable subcuticular suture. The incisions were dressed in standard sterile fashion and covered with dry dressings.     A bite block was placed into the patient's mouth. The endoscope was advanced into the mouth and into the esophagus without difficulty.  The Z line was located at the 39 cm level, and was regular.  The scope was then advanced into the stomach, and into the duodenum, which was normal .  The scope was then returned to the stomach and under maximal insufflation the stomach inspected.  There was no evidence of ulcer, gastritis, mass lesion or other abnormality.  A retroflexed view of the GE junction demonstrated a normal post fundoplication anatomy with good apposition of the GE junction to the scope, and no hiatal hernia.  The scope was then used to desufflate the stomach and removed from the patient's mouth without difficulty.     The patient recovered from anesthesia, was extubated in the operating room, and transferred to the PACU in stable condition.  All sponge and needle counts were correct times two at the completion of the procedure. There were no immediate complications.     Harshal Vasquez MD  10/21/2020  13:37 EDT

## 2020-10-21 NOTE — H&P
Pre-Op H&P  Rusty Swanson  [id number]  1960    Chief complaint: RUQ and LUQ abdominal pain    HPI:    Patient is a 59 y.o.male who presents today for a laparoscopic cholecystectomy with IOC for symptomatic cholelithiasis. He has been experiencing RUQ and LUQ pain for many months, and his pain is typically exacerbated by eating. He denies change or worsening in his symptoms. He had a Nissen done in 2017 and denies reflux or heartburn.     He notes a history of a TIA in the past. He takes an ASA 81 mg. He denies carotid artery disease. He denies chest pain or shortness of breath.      Review of Systems:  General ROS: negative for chills, fever or skin lesions;  No changes since last office visit.  Neg for recent sick exposure  Cardiovascular ROS: no chest pain or dyspnea on exertion  Respiratory ROS: no cough, shortness of breath, or wheezing    Allergies: No Known Allergies    Home Meds:    No current facility-administered medications on file prior to encounter.      Current Outpatient Medications on File Prior to Encounter   Medication Sig Dispense Refill   • aspirin 81 MG chewable tablet Chew 81 mg Daily. STOPPED 6/5/17 FOR UPCOMING SURGERY     • atorvastatin (LIPITOR) 10 MG tablet Take 10 mg by mouth Daily.     • Levothyroxine Sodium (SYNTHROID PO) Take 1 tablet/day by mouth Daily.         PMH:   Past Medical History:   Diagnosis Date   • Anemia    • Disease of thyroid gland    • Elevated cholesterol    • GERD (gastroesophageal reflux disease)    • Hiatal hernia    • History of kidney stones    • Hyperlipidemia    • Smoker    • TIA (transient ischemic attack)    • Wears glasses    • Wears glasses      PSH:    Past Surgical History:   Procedure Laterality Date   • APPENDECTOMY     • BACK SURGERY     • COLONOSCOPY      2018   • CYSTOSCOPY W/ LASER LITHOTRIPSY     • ESOPHAGEAL MOTILITY STUDY N/A 4/18/2017    Procedure: ESOPHAGEAL MOTILITY STUDY;  Surgeon: Harshal Vasquez MD;  Location: [facility] ENDOSCOPY;  " Service:    • HAND SURGERY     • HERNIA REPAIR     • PARAESOPHAGEAL HERNIA REPAIR N/A 6/8/2017    Procedure: PARAESOPHAGEAL HERNIA REPAIR LAPAROSCOPIC WITH MESH, TOUPET, EGD, PEG, LYSIS OF ADHESIONS, FUNDOPLICATION ;  Surgeon: Harshal Vasquez MD;  Location: [facility];  Service:        Immunization History:  Influenza: no  Pneumococcal: yes  Tetanus: yes    Social History:   Tobacco:   Social History     Tobacco Use   Smoking Status Current Every Day Smoker   • Packs/day: 1.00   • Years: 30.00   • Pack years: 30.00   • Types: Cigarettes   Smokeless Tobacco Never Used      Alcohol:     Social History     Substance and Sexual Activity   Alcohol Use No       Vitals:           /93 (BP Location: Right arm, Patient Position: Lying)   Pulse 79   Temp 97.5 °F (36.4 °C) (Temporal)   Resp 16   Ht 175.3 cm (69\")   Wt 98.9 kg (218 lb)   SpO2 98%   BMI 32.19 kg/m²     Physical Exam:  General Appearance:    Alert, cooperative, no distress, appears stated age   Head:    Normocephalic, without obvious abnormality, atraumatic   Lungs:     Clear to auscultation bilaterally, respirations unlabored    Heart:   Regular rate and rhythm, S1 and S2 normal, no murmur, rub    or gallop    Abdomen:    Soft, nontender.        Genitalia:    deferred   Extremities:   Extremities normal, atraumatic, no cyanosis or edema   Skin:   Skin color, texture, turgor normal, no rashes or lesions   Neurologic:   Grossly intact   Results Review  LABS:  Lab Results   Component Value Date    WBC 6.92 10/18/2020    HGB 16.9 10/18/2020    HCT 52.1 (H) 10/18/2020    MCV 94.2 10/18/2020     10/18/2020    NEUTROABS 7.98 06/09/2017    GLUCOSE 100 (H) 10/18/2020    BUN 14 10/18/2020    CREATININE 0.99 10/18/2020    EGFRIFNONA 78 10/18/2020     10/18/2020    K 4.9 10/18/2020     10/18/2020    CO2 27.0 10/18/2020    CALCIUM 9.7 10/18/2020    ALBUMIN 4.40 10/18/2020    AST 32 10/18/2020    ALT 41 10/18/2020    BILITOT 0.9 10/18/2020    " PTT <24.0 (L) 06/05/2017    INR 0.96 06/05/2017       RADIOLOGY:  Imaging Results (Last 72 Hours)     ** No results found for the last 72 hours. **          I reviewed the patient's new clinical results.      Impression:   1. Symptomatic cholelithiasis  2. History of prior Nissen fundoplication     Plan:   1. Laparoscopic cholecystectomy with IOC  2. EGD to check Nissen   Patient was seen in the office and risks and benefits were discussed. Please see office note for details.     NILSA Cotton   10/21/2020   11:49 EDT

## 2020-10-21 NOTE — ANESTHESIA POSTPROCEDURE EVALUATION
Patient: Rusty Swanson    Procedure Summary     Date: 10/21/20 Room / [facility]:  [facility]    Anesthesia Start: 1236 Anesthesia Stop:     Procedures:       CHOLECYSTECTOMY LAPAROSCOPIC (N/A Abdomen)      ESOPHAGOGASTRODUODENOSCOPY (N/A ) Diagnosis:     Surgeon: Harshal Vasquez MD Provider: Kevin Kapadia MD    Anesthesia Type: general ASA Status: 3          Anesthesia Type: general    Vitals  Vitals Value Taken Time   BP     Temp     Pulse     Resp     SpO2 94 % 10/21/20 1345   Vitals shown include unvalidated device data.        Post Anesthesia Care and Evaluation    Patient location during evaluation: PACU  Patient participation: complete - patient participated  Level of consciousness: awake and alert  Pain score: 0  Pain management: adequate  Airway patency: patent  Anesthetic complications: No anesthetic complications  PONV Status: none  Cardiovascular status: hemodynamically stable and acceptable  Respiratory status: nonlabored ventilation, acceptable and nasal cannula  Hydration status: acceptable

## 2020-10-21 NOTE — BRIEF OP NOTE
CHOLECYSTECTOMY LAPAROSCOPIC INTRAOPERATIVE CHOLANGIOGRAM, ESOPHAGOGASTRODUODENOSCOPY  Progress Note    Rusty Swanson  10/21/2020    Pre-op Diagnosis:   Symptomatic cholelithiasis       Post-Op Diagnosis Codes:  Same    Procedure/CPT® Codes:        Procedure(s):  CHOLECYSTECTOMY LAPAROSCOPIC  ESOPHAGOGASTRODUODENOSCOPY    Surgeon(s):  Harshal Vasquez MD    Anesthesia: General    Staff:   Circulator: Leanne Chin RN; Princess Byrnes RN  Scrub Person: Saira Rodriguez  Vendor Representative: Chase Piña  Nursing Assistant: Brook Hallman; Shahram, Sabina A         Estimated Blood Loss: minimal    Urine Voided: * No values recorded between 10/21/2020 12:30 PM and 10/21/2020  1:36 PM *    Specimens:                Specimens     ID Source Type Tests Collected By Collected At Frozen?      A Gallbladder Tissue · TISSUE PATHOLOGY EXAM   Harshal Vasquez MD 10/21/20 1322 No     Description: gallbladder and contents    This specimen was not marked as sent.                Drains: * No LDAs found *    Findings:   1.  Gallbladder in standard positioning.  Cholangiogram could not be performed due to incredibly small cystic duct.  2.  EGD shows normal postfundoplication anatomy without evidence of complication.    Complications: None          Harshal Vasquez MD     Date: 10/21/2020  Time: 13:36 EDT

## 2020-10-21 NOTE — ANESTHESIA PROCEDURE NOTES
Airway  Urgency: elective    Date/Time: 10/21/2020 12:40 PM  Airway not difficult    General Information and Staff    Patient location during procedure: OR  Anesthesiologist: Kevin Loredo MD    Indications and Patient Condition  Indications for airway management: airway protection    Preoxygenated: yes  MILS not maintained throughout  Mask difficulty assessment: 1 - vent by mask    Final Airway Details  Final airway type: endotracheal airway      Successful airway: ETT  Cuffed: yes   Successful intubation technique: direct laryngoscopy  Endotracheal tube insertion site: oral  Blade: Nick  Blade size: 4  ETT size (mm): 7.0  Cormack-Lehane Classification: grade IIb - view of arytenoids or posterior of glottis only  Placement verified by: chest auscultation and capnometry   Measured from: lips  ETT/EBT  to lips (cm): 21  Number of attempts at approach: 1  Assessment: lips, teeth, and gum same as pre-op and atraumatic intubation    Additional Comments  Negative epigastric sounds, Breath sound equal bilaterally with symmetric chest rise and fall

## 2020-10-22 LAB
CYTO UR: NORMAL
LAB AP CASE REPORT: NORMAL
LAB AP CLINICAL INFORMATION: NORMAL
PATH REPORT.FINAL DX SPEC: NORMAL
PATH REPORT.GROSS SPEC: NORMAL

## (undated) DEVICE — MEDI-VAC NON-CONDUCTIVE SUCTION TUBING: Brand: CARDINAL HEALTH

## (undated) DEVICE — SYR LUERLOK 20CC BX/50

## (undated) DEVICE — ANTIBACTERIAL UNDYED BRAIDED (POLYGLACTIN 910), SYNTHETIC ABSORBABLE SUTURE: Brand: COATED VICRYL

## (undated) DEVICE — SUT SILK 2/0 SH 30IN K833H

## (undated) DEVICE — GLV SURG SENSICARE PI MIC PF SZ7 LF STRL

## (undated) DEVICE — VISUALIZATION SYSTEM: Brand: CLEARIFY

## (undated) DEVICE — Device

## (undated) DEVICE — SUT VIC 0 UR6 27IN VCP603H

## (undated) DEVICE — ENDOPOUCH RETRIEVER SPECIMEN RETRIEVAL BAGS: Brand: ENDOPOUCH RETRIEVER

## (undated) DEVICE — Device: Brand: AIR/WATER CHANNEL CLEANING ADAPTER

## (undated) DEVICE — ENDOPATH XCEL BLADELESS TROCARS WITH STABILITY SLEEVES: Brand: ENDOPATH XCEL

## (undated) DEVICE — KT BIOGUARD SXN VLV AIR/H20 4PC DISP

## (undated) DEVICE — ENDOPATH XCEL UNIVERSAL TROCAR STABLILITY SLEEVES: Brand: ENDOPATH XCEL

## (undated) DEVICE — CONTN GRAD MEAS TRIANG 32OZ BLK

## (undated) DEVICE — [HIGH FLOW HEATED INSUFFLATOR TUBING,  DO NOT USE IF PACKAGE IS DAMAGED]

## (undated) DEVICE — CANNULA,ADULT,SOFT-TOUCH,7TUBE,SC: Brand: MEDLINE

## (undated) DEVICE — CVR HNDL LIGHT RIGID

## (undated) DEVICE — HARMONIC ACE +7 LAPAROSCOPIC SHEARS ADVANCED HEMOSTASIS 5MM DIAMETER 36CM SHAFT LENGTH  FOR USE WITH GRAY HAND PIECE ONLY: Brand: HARMONIC ACE

## (undated) DEVICE — DRAINBAG,ANTI-REFLUX TOWER,L/F,2000ML,LL: Brand: MEDLINE

## (undated) DEVICE — SNAP KOVER: Brand: UNBRANDED

## (undated) DEVICE — [HIGH FLOW INSUFFLATOR,  DO NOT USE IF PACKAGE IS DAMAGED,  KEEP DRY,  KEEP AWAY FROM SUNLIGHT,  PROTECT FROM HEAT AND RADIOACTIVE SOURCES.]: Brand: PNEUMOSURE

## (undated) DEVICE — DRSNG SURESITE WNDW 2.38X2.75

## (undated) DEVICE — APPL CHLORAPREP W/TINT 26ML BLU

## (undated) DEVICE — SUT SILK 2/0 TIES 18IN A185H

## (undated) DEVICE — PK LAP LASR CHOLE 10

## (undated) DEVICE — SYR LUERLOK 30CC

## (undated) DEVICE — ENDOCUT SCISSOR TIP, DISPOSABLE: Brand: RENEW

## (undated) DEVICE — AIRWY SZ11

## (undated) DEVICE — INCISIONLINE PLEDGET SFT 22X1 2.75MM

## (undated) DEVICE — TUBING, SUCTION, 1/4" X 10', STRAIGHT: Brand: MEDLINE

## (undated) DEVICE — TUBING,OXYGEN,CRUSH RES,7',CLEAR,UC: Brand: MEDLINE INDUSTRIES, INC.

## (undated) DEVICE — GLV SURG SENSICARE W/ALOE PF LF 7.5 STRL

## (undated) DEVICE — SUT SILK 0 SH 30IN K834H

## (undated) DEVICE — MEDI-VAC YANKAUER SUCTION HANDLE W/BULBOUS TIP: Brand: CARDINAL HEALTH

## (undated) DEVICE — ENDOGATOR HYBRID TUBING KIT FOR USE WITH ENDOGATOR IRRIGATION PUMP, OLYMPUS PUMP, GI4000 ESU, AND TORRENT IRRIGATION PUMP.: Brand: ENDOGATOR KIT

## (undated) DEVICE — SAFESECURE,SECUREMENT,FOLEY CATH,STERILE: Brand: MEDLINE

## (undated) DEVICE — "MH-443 SUCTION VALVE F/EVIS140 EVIS160": Brand: SUCTION VALVE

## (undated) DEVICE — GLV SURG SENSICARE W/ALOE PF LF 7 STRL

## (undated) DEVICE — "MH-438 A/W VLVE F/140 EVIS-140": Brand: AIR/WATER VALVE

## (undated) DEVICE — GOWN,PREVENTION PLUS,XXLARGE,STERILE: Brand: MEDLINE

## (undated) DEVICE — 3M™ STERI-STRIP™ REINFORCED ADHESIVE SKIN CLOSURES, R1547, 1/2 IN X 4 IN (12 MM X 100 MM), 6 STRIPS/ENVELOPE: Brand: 3M™ STERI-STRIP™

## (undated) DEVICE — PDS II VLT 0 107CM AG ST3: Brand: ENDOLOOP

## (undated) DEVICE — DRN PENRS 1/2X18IN LTX

## (undated) DEVICE — CATH ESOPH GASTRO MANOMETRY MOTILITY

## (undated) DEVICE — TOTAL TRAY, 16FR 10ML SIL FOLEY, URN: Brand: MEDLINE

## (undated) DEVICE — JELLY,LUBE,STERILE,FLIP TOP,TUBE,2-OZ: Brand: MEDLINE

## (undated) DEVICE — INTENDED USE FOR SURGICAL MARKING ON INTACT SKIN, ALSO PROVIDES A PERMANENT METHOD OF IDENTIFYING OBJECTS IN THE OPERATING ROOM: Brand: WRITESITE® REGULAR TIP SKIN MARKER

## (undated) DEVICE — SYR LUERLOK 50ML

## (undated) DEVICE — INTRO ACCSR BLNT TP

## (undated) DEVICE — DRSNG TELFA PAD NONADH STR 1S 3X8IN

## (undated) DEVICE — ST EXT IV TBG W SECUR LK 20IN

## (undated) DEVICE — THE BITE BLOCK MAXI, LATEX FREE STRAP IS USED TO PROTECT THE ENDOSCOPE INSERTION TUBE FROM BEING BITTEN BY THE PATIENT.

## (undated) DEVICE — GLV SURG SENSICARE PI MIC PF SZ7.5 LF STRL

## (undated) DEVICE — LUER-LOK 360°: Brand: CONNECTA, LUER-LOK

## (undated) DEVICE — COVER,LIGHT HANDLE,FLX,1/PK: Brand: MEDLINE INDUSTRIES, INC.